# Patient Record
Sex: MALE | Race: WHITE | NOT HISPANIC OR LATINO | Employment: OTHER | URBAN - METROPOLITAN AREA
[De-identification: names, ages, dates, MRNs, and addresses within clinical notes are randomized per-mention and may not be internally consistent; named-entity substitution may affect disease eponyms.]

---

## 2017-08-08 ENCOUNTER — TRANSFERRED RECORDS (OUTPATIENT)
Dept: HEALTH INFORMATION MANAGEMENT | Facility: CLINIC | Age: 62
End: 2017-08-08

## 2017-09-18 ENCOUNTER — SURGERY (OUTPATIENT)
Age: 62
End: 2017-09-18

## 2017-09-18 ENCOUNTER — APPOINTMENT (OUTPATIENT)
Dept: GENERAL RADIOLOGY | Facility: CLINIC | Age: 62
End: 2017-09-18
Attending: ORTHOPAEDIC SURGERY
Payer: COMMERCIAL

## 2017-09-18 ENCOUNTER — ANESTHESIA EVENT (OUTPATIENT)
Dept: SURGERY | Facility: CLINIC | Age: 62
End: 2017-09-18
Payer: COMMERCIAL

## 2017-09-18 ENCOUNTER — ANESTHESIA (OUTPATIENT)
Dept: SURGERY | Facility: CLINIC | Age: 62
End: 2017-09-18
Payer: COMMERCIAL

## 2017-09-18 ENCOUNTER — HOSPITAL ENCOUNTER (OUTPATIENT)
Facility: CLINIC | Age: 62
Discharge: HOME | End: 2017-09-18
Attending: ORTHOPAEDIC SURGERY | Admitting: ORTHOPAEDIC SURGERY
Payer: COMMERCIAL

## 2017-09-18 VITALS
HEIGHT: 71 IN | TEMPERATURE: 96.4 F | SYSTOLIC BLOOD PRESSURE: 126 MMHG | BODY MASS INDEX: 32.48 KG/M2 | WEIGHT: 232 LBS | DIASTOLIC BLOOD PRESSURE: 77 MMHG | OXYGEN SATURATION: 95 % | RESPIRATION RATE: 16 BRPM

## 2017-09-18 DIAGNOSIS — Z98.890 S/P FOOT SURGERY, RIGHT: Primary | ICD-10-CM

## 2017-09-18 PROCEDURE — 40000170 ZZH STATISTIC PRE-PROCEDURE ASSESSMENT II: Performed by: ORTHOPAEDIC SURGERY

## 2017-09-18 PROCEDURE — 25000128 H RX IP 250 OP 636: Performed by: NURSE ANESTHETIST, CERTIFIED REGISTERED

## 2017-09-18 PROCEDURE — 25000125 ZZHC RX 250: Performed by: NURSE ANESTHETIST, CERTIFIED REGISTERED

## 2017-09-18 PROCEDURE — L8699 PROSTHETIC IMPLANT NOS: HCPCS | Performed by: ORTHOPAEDIC SURGERY

## 2017-09-18 PROCEDURE — 37000008 ZZH ANESTHESIA TECHNICAL FEE, 1ST 30 MIN: Performed by: ORTHOPAEDIC SURGERY

## 2017-09-18 PROCEDURE — 25000128 H RX IP 250 OP 636: Performed by: ANESTHESIOLOGY

## 2017-09-18 PROCEDURE — 25000566 ZZH SEVOFLURANE, EA 15 MIN: Performed by: ORTHOPAEDIC SURGERY

## 2017-09-18 PROCEDURE — 27211024 ZZHC OR SUPPLY OTHER OPNP: Performed by: ORTHOPAEDIC SURGERY

## 2017-09-18 PROCEDURE — 25000128 H RX IP 250 OP 636: Performed by: PHYSICIAN ASSISTANT

## 2017-09-18 PROCEDURE — 36000060 ZZH SURGERY LEVEL 3 W FLUORO 1ST 30 MIN: Performed by: ORTHOPAEDIC SURGERY

## 2017-09-18 PROCEDURE — 71000012 ZZH RECOVERY PHASE 1 LEVEL 1 FIRST HR: Performed by: ORTHOPAEDIC SURGERY

## 2017-09-18 PROCEDURE — C1713 ANCHOR/SCREW BN/BN,TIS/BN: HCPCS | Performed by: ORTHOPAEDIC SURGERY

## 2017-09-18 PROCEDURE — 27210794 ZZH OR GENERAL SUPPLY STERILE: Performed by: ORTHOPAEDIC SURGERY

## 2017-09-18 PROCEDURE — 36000058 ZZH SURGERY LEVEL 3 EA 15 ADDTL MIN: Performed by: ORTHOPAEDIC SURGERY

## 2017-09-18 PROCEDURE — 71000013 ZZH RECOVERY PHASE 1 LEVEL 1 EA ADDTL HR: Performed by: ORTHOPAEDIC SURGERY

## 2017-09-18 PROCEDURE — 37000009 ZZH ANESTHESIA TECHNICAL FEE, EACH ADDTL 15 MIN: Performed by: ORTHOPAEDIC SURGERY

## 2017-09-18 PROCEDURE — 40000277 XR SURGERY CARM FLUORO LESS THAN 5 MIN W STILLS

## 2017-09-18 PROCEDURE — 71000027 ZZH RECOVERY PHASE 2 EACH 15 MINS: Performed by: ORTHOPAEDIC SURGERY

## 2017-09-18 PROCEDURE — 27110028 ZZH OR GENERAL SUPPLY NON-STERILE: Performed by: ORTHOPAEDIC SURGERY

## 2017-09-18 DEVICE — GRAFT BONE INFUSE BMP SM 7510200: Type: IMPLANTABLE DEVICE | Site: ANKLE | Status: FUNCTIONAL

## 2017-09-18 DEVICE — IMP SCR ARTHREX BLUE LOCKING 3.5X24MM AR-8935L-24: Type: IMPLANTABLE DEVICE | Site: ANKLE | Status: FUNCTIONAL

## 2017-09-18 DEVICE — IMP SCR ARTHREX CAN 4.0X28MM AR-8940-28: Type: IMPLANTABLE DEVICE | Site: ANKLE | Status: FUNCTIONAL

## 2017-09-18 DEVICE — IMP PLATE ARTHREX MIDFOOT FLAT H SM LT AR-8942L-S: Type: IMPLANTABLE DEVICE | Site: ANKLE | Status: FUNCTIONAL

## 2017-09-18 DEVICE — IMP PLATE ARTHREX MIDFOOT FLAT H SM RT AR-8942R-S: Type: IMPLANTABLE DEVICE | Site: ANKLE | Status: FUNCTIONAL

## 2017-09-18 DEVICE — IMP SCR ARTHREX BLUE LOCKING 3.5X32MM AR-8935L-32: Type: IMPLANTABLE DEVICE | Site: ANKLE | Status: FUNCTIONAL

## 2017-09-18 DEVICE — IMP SCR ARTHREX BLUE LOCKING 3.5X26MM AR-8935L-26: Type: IMPLANTABLE DEVICE | Site: ANKLE | Status: FUNCTIONAL

## 2017-09-18 DEVICE — IMP SCR ARTHREX CAN 4.0X30MM AR-8940-30: Type: IMPLANTABLE DEVICE | Site: ANKLE | Status: FUNCTIONAL

## 2017-09-18 DEVICE — IMP SCR ARTHREX CAN 4.0X26MM AR-8940-26: Type: IMPLANTABLE DEVICE | Site: ANKLE | Status: FUNCTIONAL

## 2017-09-18 DEVICE — IMP SCR ARTHREX BLUE LOCKING 3.5X16MM AR-8935L-16: Type: IMPLANTABLE DEVICE | Site: ANKLE | Status: FUNCTIONAL

## 2017-09-18 RX ORDER — MEPERIDINE HYDROCHLORIDE 25 MG/ML
12.5 INJECTION INTRAMUSCULAR; INTRAVENOUS; SUBCUTANEOUS
Status: DISCONTINUED | OUTPATIENT
Start: 2017-09-18 | End: 2017-09-18 | Stop reason: HOSPADM

## 2017-09-18 RX ORDER — CEFAZOLIN SODIUM 1 G/3ML
1 INJECTION, POWDER, FOR SOLUTION INTRAMUSCULAR; INTRAVENOUS SEE ADMIN INSTRUCTIONS
Status: DISCONTINUED | OUTPATIENT
Start: 2017-09-18 | End: 2017-09-18 | Stop reason: HOSPADM

## 2017-09-18 RX ORDER — LIDOCAINE HYDROCHLORIDE 20 MG/ML
INJECTION, SOLUTION INFILTRATION; PERINEURAL PRN
Status: DISCONTINUED | OUTPATIENT
Start: 2017-09-18 | End: 2017-09-18

## 2017-09-18 RX ORDER — NEOSTIGMINE METHYLSULFATE 1 MG/ML
VIAL (ML) INJECTION PRN
Status: DISCONTINUED | OUTPATIENT
Start: 2017-09-18 | End: 2017-09-18

## 2017-09-18 RX ORDER — AMOXICILLIN 250 MG
1-2 CAPSULE ORAL 2 TIMES DAILY
Qty: 30 TABLET | Refills: 0 | Status: SHIPPED | OUTPATIENT
Start: 2017-09-18 | End: 2022-08-18

## 2017-09-18 RX ORDER — OXYCODONE AND ACETAMINOPHEN 5; 325 MG/1; MG/1
1-2 TABLET ORAL
Status: DISCONTINUED | OUTPATIENT
Start: 2017-09-18 | End: 2017-09-18 | Stop reason: HOSPADM

## 2017-09-18 RX ORDER — SODIUM CHLORIDE, SODIUM LACTATE, POTASSIUM CHLORIDE, CALCIUM CHLORIDE 600; 310; 30; 20 MG/100ML; MG/100ML; MG/100ML; MG/100ML
INJECTION, SOLUTION INTRAVENOUS CONTINUOUS
Status: DISCONTINUED | OUTPATIENT
Start: 2017-09-18 | End: 2017-09-18 | Stop reason: HOSPADM

## 2017-09-18 RX ORDER — PROPOFOL 10 MG/ML
INJECTION, EMULSION INTRAVENOUS CONTINUOUS PRN
Status: DISCONTINUED | OUTPATIENT
Start: 2017-09-18 | End: 2017-09-18

## 2017-09-18 RX ORDER — ONDANSETRON 4 MG/1
4 TABLET, ORALLY DISINTEGRATING ORAL EVERY 30 MIN PRN
Status: DISCONTINUED | OUTPATIENT
Start: 2017-09-18 | End: 2017-09-18 | Stop reason: HOSPADM

## 2017-09-18 RX ORDER — ONDANSETRON 2 MG/ML
4 INJECTION INTRAMUSCULAR; INTRAVENOUS EVERY 30 MIN PRN
Status: DISCONTINUED | OUTPATIENT
Start: 2017-09-18 | End: 2017-09-18 | Stop reason: HOSPADM

## 2017-09-18 RX ORDER — HYDROXYZINE HYDROCHLORIDE 25 MG/1
25 TABLET, FILM COATED ORAL EVERY 6 HOURS PRN
Qty: 30 TABLET | Refills: 0 | Status: SHIPPED | OUTPATIENT
Start: 2017-09-18 | End: 2022-08-18

## 2017-09-18 RX ORDER — FENTANYL CITRATE 50 UG/ML
INJECTION, SOLUTION INTRAMUSCULAR; INTRAVENOUS PRN
Status: DISCONTINUED | OUTPATIENT
Start: 2017-09-18 | End: 2017-09-18

## 2017-09-18 RX ORDER — LOSARTAN POTASSIUM AND HYDROCHLOROTHIAZIDE 12.5; 1 MG/1; MG/1
1 TABLET ORAL DAILY
COMMUNITY
End: 2022-08-18

## 2017-09-18 RX ORDER — OXYCODONE AND ACETAMINOPHEN 5; 325 MG/1; MG/1
1-2 TABLET ORAL EVERY 4 HOURS PRN
Qty: 40 TABLET | Refills: 0 | Status: SHIPPED | OUTPATIENT
Start: 2017-09-18 | End: 2022-08-18

## 2017-09-18 RX ORDER — CEFAZOLIN SODIUM 2 G/100ML
2 INJECTION, SOLUTION INTRAVENOUS
Status: COMPLETED | OUTPATIENT
Start: 2017-09-18 | End: 2017-09-18

## 2017-09-18 RX ORDER — PROPOFOL 10 MG/ML
INJECTION, EMULSION INTRAVENOUS PRN
Status: DISCONTINUED | OUTPATIENT
Start: 2017-09-18 | End: 2017-09-18

## 2017-09-18 RX ORDER — ONDANSETRON 4 MG/1
4-8 TABLET, ORALLY DISINTEGRATING ORAL EVERY 8 HOURS PRN
Qty: 12 TABLET | Refills: 0 | Status: SHIPPED | OUTPATIENT
Start: 2017-09-18 | End: 2022-08-18

## 2017-09-18 RX ORDER — GLYCOPYRROLATE 0.2 MG/ML
INJECTION, SOLUTION INTRAMUSCULAR; INTRAVENOUS PRN
Status: DISCONTINUED | OUTPATIENT
Start: 2017-09-18 | End: 2017-09-18

## 2017-09-18 RX ORDER — FENTANYL CITRATE 50 UG/ML
25-50 INJECTION, SOLUTION INTRAMUSCULAR; INTRAVENOUS EVERY 5 MIN PRN
Status: DISCONTINUED | OUTPATIENT
Start: 2017-09-18 | End: 2017-09-18 | Stop reason: HOSPADM

## 2017-09-18 RX ORDER — FENTANYL CITRATE 50 UG/ML
25-50 INJECTION, SOLUTION INTRAMUSCULAR; INTRAVENOUS
Status: DISCONTINUED | OUTPATIENT
Start: 2017-09-18 | End: 2017-09-18 | Stop reason: HOSPADM

## 2017-09-18 RX ORDER — HYDROMORPHONE HYDROCHLORIDE 1 MG/ML
.3-.5 INJECTION, SOLUTION INTRAMUSCULAR; INTRAVENOUS; SUBCUTANEOUS EVERY 10 MIN PRN
Status: DISCONTINUED | OUTPATIENT
Start: 2017-09-18 | End: 2017-09-18 | Stop reason: HOSPADM

## 2017-09-18 RX ORDER — ONDANSETRON 4 MG/1
4 TABLET, ORALLY DISINTEGRATING ORAL
Status: DISCONTINUED | OUTPATIENT
Start: 2017-09-18 | End: 2017-09-18 | Stop reason: HOSPADM

## 2017-09-18 RX ORDER — DEXAMETHASONE SODIUM PHOSPHATE 4 MG/ML
INJECTION, SOLUTION INTRA-ARTICULAR; INTRALESIONAL; INTRAMUSCULAR; INTRAVENOUS; SOFT TISSUE PRN
Status: DISCONTINUED | OUTPATIENT
Start: 2017-09-18 | End: 2017-09-18

## 2017-09-18 RX ORDER — SUMATRIPTAN 50 MG/1
50 TABLET, FILM COATED ORAL
COMMUNITY

## 2017-09-18 RX ORDER — TADALAFIL 20 MG/1
10-20 TABLET ORAL DAILY PRN
COMMUNITY

## 2017-09-18 RX ORDER — NALOXONE HYDROCHLORIDE 0.4 MG/ML
.1-.4 INJECTION, SOLUTION INTRAMUSCULAR; INTRAVENOUS; SUBCUTANEOUS
Status: DISCONTINUED | OUTPATIENT
Start: 2017-09-18 | End: 2017-09-18 | Stop reason: HOSPADM

## 2017-09-18 RX ORDER — EPHEDRINE SULFATE 50 MG/ML
INJECTION, SOLUTION INTRAMUSCULAR; INTRAVENOUS; SUBCUTANEOUS PRN
Status: DISCONTINUED | OUTPATIENT
Start: 2017-09-18 | End: 2017-09-18

## 2017-09-18 RX ADMIN — GLYCOPYRROLATE 0.6 MG: 0.2 INJECTION, SOLUTION INTRAMUSCULAR; INTRAVENOUS at 15:05

## 2017-09-18 RX ADMIN — NEOSTIGMINE METHYLSULFATE 3 MG: 1 INJECTION INTRAMUSCULAR; INTRAVENOUS; SUBCUTANEOUS at 15:05

## 2017-09-18 RX ADMIN — MIDAZOLAM HYDROCHLORIDE 2 MG: 1 INJECTION, SOLUTION INTRAMUSCULAR; INTRAVENOUS at 13:58

## 2017-09-18 RX ADMIN — SODIUM CHLORIDE, POTASSIUM CHLORIDE, SODIUM LACTATE AND CALCIUM CHLORIDE: 600; 310; 30; 20 INJECTION, SOLUTION INTRAVENOUS at 13:40

## 2017-09-18 RX ADMIN — MIDAZOLAM HYDROCHLORIDE 2 MG: 1 INJECTION, SOLUTION INTRAMUSCULAR; INTRAVENOUS at 12:49

## 2017-09-18 RX ADMIN — PROPOFOL 200 MCG/KG/MIN: 10 INJECTION, EMULSION INTRAVENOUS at 13:59

## 2017-09-18 RX ADMIN — CEFAZOLIN SODIUM 2 G: 2 INJECTION, SOLUTION INTRAVENOUS at 14:12

## 2017-09-18 RX ADMIN — ROPIVACAINE HYDROCHLORIDE 50 ML: 5 INJECTION, SOLUTION EPIDURAL; INFILTRATION; PERINEURAL at 12:57

## 2017-09-18 RX ADMIN — LIDOCAINE HYDROCHLORIDE 100 MG: 20 INJECTION, SOLUTION INFILTRATION; PERINEURAL at 13:59

## 2017-09-18 RX ADMIN — Medication 5 MG: at 14:42

## 2017-09-18 RX ADMIN — ONDANSETRON 4 MG: 2 INJECTION INTRAMUSCULAR; INTRAVENOUS at 14:56

## 2017-09-18 RX ADMIN — ROCURONIUM BROMIDE 50 MG: 10 INJECTION INTRAVENOUS at 14:02

## 2017-09-18 RX ADMIN — FENTANYL CITRATE 50 MCG: 50 INJECTION, SOLUTION INTRAMUSCULAR; INTRAVENOUS at 15:07

## 2017-09-18 RX ADMIN — SODIUM CHLORIDE, POTASSIUM CHLORIDE, SODIUM LACTATE AND CALCIUM CHLORIDE: 600; 310; 30; 20 INJECTION, SOLUTION INTRAVENOUS at 14:55

## 2017-09-18 RX ADMIN — PHENYLEPHRINE HYDROCHLORIDE 100 MCG: 10 INJECTION, SOLUTION INTRAMUSCULAR; INTRAVENOUS; SUBCUTANEOUS at 14:35

## 2017-09-18 RX ADMIN — PHENYLEPHRINE HYDROCHLORIDE 150 MCG: 10 INJECTION, SOLUTION INTRAMUSCULAR; INTRAVENOUS; SUBCUTANEOUS at 14:21

## 2017-09-18 RX ADMIN — FENTANYL CITRATE 50 MCG: 50 INJECTION, SOLUTION INTRAMUSCULAR; INTRAVENOUS at 13:59

## 2017-09-18 RX ADMIN — PROPOFOL 200 MG: 10 INJECTION, EMULSION INTRAVENOUS at 13:59

## 2017-09-18 RX ADMIN — DEXAMETHASONE SODIUM PHOSPHATE 4 MG: 4 INJECTION, SOLUTION INTRA-ARTICULAR; INTRALESIONAL; INTRAMUSCULAR; INTRAVENOUS; SOFT TISSUE at 14:15

## 2017-09-18 RX ADMIN — PROPOFOL 70 MG: 10 INJECTION, EMULSION INTRAVENOUS at 14:02

## 2017-09-18 NOTE — ANESTHESIA PROCEDURE NOTES
Procedures  Rt popliteal and saphenous blocks for pain relief at surgeon's request.  With the patient in the prone position, after IV started,  pulse oximeter in place and Versed 2mg IV: 22 GA Stimuplex and 25 GA; 50cc (40 + 10) 1/2 % Ropivicaine; 1;200,000 Epi   Depth 6cm

## 2017-09-18 NOTE — IP AVS SNAPSHOT
MRN:[id number]                      After Visit Summary   9/18/2017    Ned Pimentel    MRN: [id number]           Thank you!     Thank you for choosing [facility] for your care. Our goal is always to provide you with excellent care. Hearing back from our patients is one way we can continue to improve our services. Please take a few minutes to complete the written survey that you may receive in the mail after you visit with us. Thank you!        Patient Information     Date Of Birth          1955        About your hospital stay     You were admitted on:  September 18, 2017 [facility]    You were discharged on:  September 18, 2017       Who to Call     For medical emergencies, please call 911.  For non-urgent questions about your medical care, please call your primary care provider or clinic, [phone]  For questions related to your surgery, please call your surgery clinic        Attending Provider     Provider Specialty    Mary Jaeger MD Internal Medicine       Primary Care Provider Office Phone # Fax #    Jarvis Hopper MD, [phone]      After Care Instructions     Activity       Ambulate with assistance until independent            Discharge Instructions       Review discharge instructions as directed by Provider.            Discharge Instructions       Patient to arrange for return to clinic appointment in two weeks.            Elevate affected extremity           Ice to affected area       Ice pack to affected area PRN (as needed).            No Aspirin, Ibuprofen or Naproxen products        for 7 - 10 days post surgery            No dressing change       until follow up clinic appointment.            No driving or operating machinery       until the day after procedure            Weight bearing status - Toe touch                 Further instructions from your care team       Same Day Surgery Discharge  Instructions for  Sedation and General Anesthesia       It's not unusual to feel dizzy, light-headed or faint for up to 24 hours after surgery or while taking pain medication.  If you have these symptoms: sit for a few minutes before standing and have someone assist you when you get up to walk or use the bathroom.      You should rest and relax for the next 24 hours. We recommend you make arrangements to have an adult stay with you for at least 24 hours after your discharge.  Avoid hazardous and strenuous activity.      DO NOT DRIVE any vehicle or operate mechanical equipment for 24 hours following the end of your surgery.  Even though you may feel normal, your reactions may be affected by the medication you have received.      Do not drink alcoholic beverages for 24 hours following surgery.       Slowly progress to your regular diet as you feel able. It's not unusual to feel nauseated and/or vomit after receiving anesthesia.  If you develop these symptoms, drink clear liquids (apple juice, ginger ale, broth, 7-up, etc. ) until you feel better.  If your nausea and vomiting persists for 24 hours, please notify your surgeon.        All narcotic pain medications, along with inactivity and anesthesia, can cause constipation. Drinking plenty of liquids and increasing fiber intake will help.      For any questions of a medical nature, call your surgeon.      Do not make important decisions for 24 hours.      If you had general anesthesia, you may have a sore throat for a couple of days related to the breathing tube used during surgery.  You may use Cepacol lozenges to help with this discomfort.  If it worsens or if you develop a fever, contact your surgeon.       If you feel your pain is not well managed with the pain medications prescribed by your surgeon, please contact your surgeon's office to let them know so they can address your concerns.       POST-OPERATIVE INSTRUCTIONS  FOOT AND ANKLE SURGERY  LORENA Jaeger,  SUHA Magana P.A.-C    These precautions MUST be followed for the first 24 hours after surgery:    Upon discharge, go directly home.    You must make arrangements to have a responsible adult stay with you.    DO NOT DRINK ALCOHOLIC BEVERAGES    It is not unusual to feel lightheaded up to 24 hours after surgery or while taking pain medication.  If you feel lightheaded, sit for a few minutes before standing and have someone assisted you when you get up to walk or use the restroom.    Do not use any mechanical equipment.    Do not make any important or legal decisions for 24 hours or while on pain medications.    You may experience dry mouth, sore throat, or sleep disturbances from the anesthesia and medications used during surgery.  Generalized muscle aches can sometimes occur.  These usually disappear in 12-24 hours.    POST-OPERATIVE CARE GUIDELINES    The following are general guidelines about what to expect the first days/weeks after surgery.  They are not specific, and your recovery may be slightly different.    Blood clots are not common, but are emergencies.  If you have sudden onset pain in your calf or leg, or have sudden shortness of breath, go to the Emergency Department.      Elevation of your operative foot/ankle is key to reducing the swelling in the immediate post-operative phase (first 3-5 days).  When you are at rest, elevate your foot at or above the level of your heart.  When sitting, your foot should be elevated on a chair or stool; not hanging down.      You should plan to rest the day after surgery no matter how minor the procedure.  More complicated procedures will require more time to return to normal activity.      Foot and ankle surgery is painful in most cases - use your medication as directed for the first 24 hours after surgery.  It is not unusual for the pain to be worse a day or two after surgery than on the day of.  If your pain is more than 8/10 contact our office.  If you  don t have pain, gradually decrease your pain meds by substituting Tylenol.  Please don t use Advil/ibuprofen unless we order it for you.      You will be prescribed Percocet or Vicodin for pain, Vistaril for spasms/pain adjunct, Senokot for constipation.  If you have had trouble taking these meds before or experience nausea or vomiting after surgery from your medication, please advise the recovery room nurses.  If you are already at home, try drinking only clear liquids and/or call our office.      All pain medications, along with inactivity can cause constipation.  Use the Senakot as directed, increase fluid intake to 1 quart per day and increase your dietary fiber.  (The  P  fruits - peaches, plums, pears, and prunes as well as anise/black licorice are recommended.)    It is not unusual to run a low-grade fever after surgery.  If your temperature is elevated above 102 , lasts longer than 24 hours, or is questionable in any way, contact our office.      Drainage from your cast/dressing is normal.  Reinforce your cast/dressing with 4x4 dressings and cover with an Ace wrap.  Wait until 24 hours after surgery to change your dressings; by this time most of your bleeding will have stopped.  If you have drainage through your dressings 2 days after surgery, contact our office.      You cast/dressing will be changed at your 2-week follow up appointment.  They should be kept clean and DRY.  If your cast/dressing is damaged before that, contact our office.      It is normal to experience some bruising in the toes after surgery and they may appear  dark  when your foot hangs down.  It is important to actively wiggle your toes for at least 5-10 minutes each hour UNLESS you had surgery on those toes.      Use ice on your foot/ankle over the dressing/cast for 20 minutes per hour, 10 or more times per day.  A large bag of frozen peas works well.      Bathing: take a tub or sponge bath instead of a shower if possible during the  first two weeks.  If you choose to shower, cover the dressing/cast with a waterproof covering, these may be ordered from www.seal-tight.com or are available at some pharmacies/medical supply stores.  Another option is XeroSox, which is the original vacuum sealed bandage and cast cover.  The cast cover has a vacuum seal and is absolutely waterproof.  1-203.412.8367 or www.xerosox.[employer] for more information.      Driving:  For surgery on the left foot/ankle, you may drive as soon as narcotic medications are stopped.  For surgery on the right foot/ankle, you may drive when you are out of a cast, off pain medications, and you feel secure with braking.      In general, listen to your foot/ankle.  If you have a sudden, dramatic increase in pain that does not resolve after an hour or so, something is wrong - call our office.  If you fall or bump your foot/ankle and have sudden pain that resolves, give it 24 hours before you call.      Many of your questions can be addressed at your 2-week follow-up appointment - please make a list of things to ask us as they come up during your recovery.      If you had a nerve block it is common to have numbness in your leg and foot for up to 30 hours after surgery.  If your leg or foot is still numb more than 30 hours after surgery, please call the office.      FUTURE DENTIST VISITS:  If you have had a total ankle arthroplasty please be advised you must be on antibiotics prior to ANY dental work.  Please call your dentist office ahead of time and make them aware of this as your dentist will be able to order the prescription.  If you have had any other surgery this is not a concern.    If you have any further questions or concerns, please call our office at [phone]    **If you have questions or concerns about your procedure,   call Dr. Jaeger at [phone]**    [facility]      DISCHARGE INSTRUCTIONS FOLLOWING   REGIONAL BLOCK ANESTHESIA      Numbness or lack of  "feeling in the arm/leg that was operated may last up to 24 hours.  The average time is usually 10-15 hours.  You may not be able to lift or move the arm or leg where the operation was by itself during that time.  Long-acting local anesthetic medicines were used to give you long-lasting pain relief.    Wear a sling until your arm is completely \"awake\"    Avoid bumping your arm, leg or foot while it is numb    Avoid extremes of hot or cold while it is numb    Remain quiet and restful the day of surgery.  Resume normal activities gradually over the next day or so as advise by your surgeon.    Do not drive or operate  Any machinery until your extremity is full  \"awake\"        You will have a tingling and prickly sensation in your arm/leg as the feeling begins to return; you can also expect some discomfort. The amount of discomfort is unpredictable, but if you have more pain that can be controlled with the pain medication you received, you should contact your surgeon.  Start to take your pain pills as soon as you start to feel any discomfort or pain.  We strongly recommend starting your pain medication at bedtime if you haven't already done so.  This is in the event that the block wears off while you are asleep.        Crutch Instructions      Because of your surgery you will need crutches.  Make sure you tell your healthcare provider if crutches do not seem to work for you.  Using Crutches   Crutches are the most commonly used device for injuries to the lower part of the body because they allow the most mobility. All walking assistance devices require strength in your upper body but crutches require more coordination. If you are unable to use crutches then a cane or walker may be better.   Remember these rules when using crutches:   Always look straight ahead when you walk. Do not look down.   Hold the top padded part of the crutches into your chest near your armpits. Grasp the padded hand  and always support your " "weight with your arms and hands.   Do not lean on the crutches with your armpit as this could cause nerve damage.  Standing Up  Make sure you are in a stable chair. Move forward to the edge of the chair so that your  good  foot is flat on the floor. Put both crutches together and hold the handgrips in one hand. Stretch the injured leg out straight and then use the crutches with one hand and the chair armrest with the other hand to push yourself into a standing position onto your  good  leg. Once you are standing, wait until you are steady before placing a crutch in each hand. Until you become good at standing, have someone assist you in case you lose your balance. When standing still, lean slightly forward with the crutches ahead of you and about 3 feet apart. Unless you are told otherwise, you should keep weight off your injured leg.  Walking  To begin crutch walking, balance yourself on your  good  leg. Move both crutches forward about the length of one step and place them firmly on the floor in front of you about 3 feet apart. Support your weight on the padded hand rests while leaning forward. Press the top of the crutches against your chest wall and not into your armpits. Be sure to hold the injured leg up off of the floor. When ready, swing the \"good\" leg forward about one step length past the crutches while supporting your weight on the padded hand . Be careful not to go too far. Transfer your weight onto the \"good\" leg then bring both crutches forward about the length of one step. Repeating this motion will allow you to move fairly quickly without the use of your injured leg. Keep practicing until you become good at crutch walking.  Sitting Down  Make sure the chair you are about to sit on is stable. Walk in front of the chair such that you are facing away from it. Move back a little at a time until the back of your  good  leg is nearly touching the seat. Keeping your weight on the  good  leg, move both " "crutches to one hand holding onto the handgrips. Lean forward, bend your  good  knee, and move your injured leg out forward. Sit down slowly while using your free hand to reach for the chair s armrest for support. Place the crutches where you can easily get them. Never pivot, even on your  good  leg. This could cause you to fall or injure your  good  leg.  Navigating Stairs, Curbs & Door Steps  Only attempt this once you are very comfortable with regular crutch walking and only when someone is there to steady you should you begin to lose your balance. Hold on to a  railing with one hand and both crutches in the other hand or have someone carry the loose crutch for you. It does not matter which side the handrail is on. If there is no handrail, you can use both crutches. Using only crutches is the same as the railing method but maintaining your balance can be difficult. The crutch-only method is not recommended until you have become very good at crutch walking. Be sure to only take one step at a time. A simple rule to remember for crutches when navigating stairs or curbs: up with the  good  leg and down with the  bad .  Going Up Stairs or Curbs  Walk close to the first step with the crutches slightly behind you. Push down on the handrail and the crutch and step up with the \"good\" leg. You may have to make a short hop to do this if you are not allowed to place any weight on the injured leg. Lean forward and bring the injured leg and the crutch up beside the \"good\" leg. Repeat this until you have climbed up all of the steps. Remember, the \"good\" leg goes up first and the crutches move with the injured leg. The person helping you should stand behind and to your side that is away from the railing.  Going Down Stairs or Curbs  Walk to the edge of the first step. While standing and balancing on the  good  leg, place both crutches in one hand and then down on the step below. Be sure to support your weight by " "leaning on the crutches and handrail. Bring the injured leg forward, then the \"good\" leg down to the same step as the crutches. Remember crutches go down first with the injured leg. The person helping you should  front and to your side that is away from the railing.  Sitting Method for Navigating Stairs  A safer way to get up and down stairs is to sit down. To go up steps, sit down on the second or third step. Push with your  good  leg below while pushing up with both arms on the step above to move your bottom to the next step. When you get to the top of the stairs you may need to use the  scooting  method described later to get back up. To go down steps you first need to lower yourself to the floor near the top of the steps. Once seated, support yourself with your  good  leg on the second to next step below, and push with both arms on the step where you are sitting to move your bottom down to the next step. When you reach the bottom, pull yourself up to standing position using your crutches. It is helpful if someone can move your crutches and assist you in getting up and down. With practice it may be possible to manage on your own.  If You Start To Fall  If you start to fall and cannot get your balance, throw the crutches away from you. Try to fall onto your  good  side away from your injury and then break your fall with your arms. If uninjured, you can usually get back up by moving into a sitting position and scooting to a chair. Push with your arms and hands on the chair seat while pushing with the \"good\" leg to get up into the chair. You should not attempt to get back up if you are having significant pain. Call for assistance or dial 911 for an ambulance if necessary.  Safety Tips for Crutch Walking   At first you may want to wear a training belt (or a strong regular belt) so others can assist you.   Do not use crutches if you feel dizzy or drowsy.   Be careful on slick or wet surfaces like a kitchen or " "bathroom floor, snow, ice, or rainy conditions.   Temporarily remove pets, throw rugs or other items from your home that might trip you.   Do not hop around while holding onto furniture.   Wear sneakers or rubber soled shoes that will not easily slip or come off.   Be careful on ramps or slopes as they can be harder to walk up or down   Do not remove any parts from your crutches especially the padding or rubber traction footings. Replace padding or footings that become damaged immediately.   It is important to use your crutches correctly. If you feel any numbness or tingling in your arms, you are probably using the crutches incorrectly.  Helpful Hints   A bedside toilet or toilet riser may be helpful.   Always allow for extra time to get around. Children in school should be given permission to leave class early to avoid crowds when changing classes.   Elevate the injured leg when sitting.   Use a backpack to carry books or waist pouch to carry other items so that both hands are free.   Call your healthcare provider if you have any questions or concerns.                          Pending Results     Date and Time Order Name Status Description    9/18/2017 1500 XR Surgery DILLAN L/T 5 Min Fluoro w Stills In process             Statement of Approval     Ordered          09/18/17 6508  I have reviewed and agree with all the recommendations and orders detailed in this document.  EFFECTIVE NOW     Approved and electronically signed by:  Mukul Magana, GABRIELLE             Admission Information     Date & Time Provider Department Dept. Phone    9/18/2017 Mary Jaeger MD [facility] Same Day Surgery [phone]      Your Vitals Were     Blood Pressure Temperature Respirations Height Weight Pulse Oximetry    100/68 96.4  F (35.8  C) 14 1.803 m (5' 11\") 105.2 kg (232 lb) 96%    BMI (Body Mass Index)                   32.36 kg/m2           MyChart Information     [email or web address] lets you send messages to your doctor, " "view your test results, renew your prescriptions, schedule appointments and more. To sign up, go to [email or web address] . Click on \"Log in\" on the left side of the screen, which will take you to the Welcome page. Then click on \"Sign up Now\" on the right side of the page.     You will be asked to enter the access code listed below, as well as some personal information. Please follow the directions to create your username and password.     Your access code is: [email or web address]  Expires: 2017  3:45 PM     Your access code will  in 90 days. If you need help or a new code, please call your [facility] clinic or [phone].        Care EveryWhere ID     This is your Care EveryWhere ID. This could be used by other organizations to access your [facility] medical records  [id number]        Equal Access to Services     NATHAN [facility] CenterALESSANDRA : Matt Sanabria, fermin rojas, marcelino guzmán, mary alice lenz . [facility] [phone].    ATENCIÓN: Si habla español, tiene a regan disposición servicios gratuitos de asistencia lingüística. Earnest al [phone].    We comply with applicable federal civil rights laws and [place] laws. We do not discriminate on the basis of race, color, national origin, age, disability sex, sexual orientation or gender identity.               Review of your medicines      START taking        Dose / Directions    hydrOXYzine 25 MG tablet   Commonly known as:  ATARAX   Used for:  S/P foot surgery, right        Dose:  25 mg   Take 1 tablet (25 mg) by mouth every 6 hours as needed for itching (and nausea)   Quantity:  30 tablet   Refills:  0       ondansetron 4 MG ODT tab   Commonly known as:  ZOFRAN-ODT   Used for:  S/P foot surgery, right        Dose:  4-8 mg   Take 1-2 tablets (4-8 mg) by mouth every 8 hours as needed for nausea Dissolve ON the tongue.   Quantity:  12 tablet   Refills:  0       oxyCODONE-acetaminophen 5-325 MG per tablet   Commonly " known as:  PERCOCET   Used for:  S/P foot surgery, right        Dose:  1-2 tablet   Take 1-2 tablets by mouth every 4 hours as needed for pain (moderate to severe)   Quantity:  40 tablet   Refills:  0       senna-docusate 8.6-50 MG per tablet   Commonly known as:  SENOKOT-S;PERICOLACE   Used for:  S/P foot surgery, right        Dose:  1-2 tablet   Take 1-2 tablets by mouth 2 times daily Take while on oral narcotics to prevent or treat constipation.   Quantity:  30 tablet   Refills:  0         CONTINUE these medicines which have NOT CHANGED        Dose / Directions    AMLODIPINE BESYLATE PO        Dose:  5 mg   Take 5 mg by mouth daily   Refills:  0       ASPIRIN PO        Dose:  81 mg   Take 81 mg by mouth daily   Refills:  0       CIALIS PO        Dose:  20 mg   Take 20 mg by mouth   Refills:  0       IMITREX PO        Dose:  50 mg   Take 50 mg by mouth Take at onset of migraine. May repeat in 2 hours if needed.   Refills:  0       losartan-hydrochlorothiazide 100-12.5 MG per tablet   Commonly known as:  HYZAAR        Dose:  1 tablet   Take 1 tablet by mouth daily   Refills:  0       SILDENAFIL CITRATE PO        Dose:  20 mg   Take 20 mg by mouth   Refills:  0         STOP taking     IBUPROFEN PO                Where to get your medicines      These medications were sent to [facility] - [address]  [address], [place] [address]     Phone:  [phone]     hydrOXYzine 25 MG tablet    ondansetron 4 MG ODT tab    senna-docusate 8.6-50 MG per tablet         Some of these will need a paper prescription and others can be bought over the counter. Ask your nurse if you have questions.     Bring a paper prescription for each of these medications     oxyCODONE-acetaminophen 5-325 MG per tablet                Protect others around you: Learn how to safely use, store and throw away your medicines at www.disposemymeds.org.             Medication List: This is a list of all your  medications and when to take them. Check marks below indicate your daily home schedule. Keep this list as a reference.      Medications           Morning Afternoon Evening Bedtime As Needed    AMLODIPINE BESYLATE PO   Take 5 mg by mouth daily                                ASPIRIN PO   Take 81 mg by mouth daily                                CIALIS PO   Take 20 mg by mouth                                hydrOXYzine 25 MG tablet   Commonly known as:  ATARAX   Take 1 tablet (25 mg) by mouth every 6 hours as needed for itching (and nausea)                                IMITREX PO   Take 50 mg by mouth Take at onset of migraine. May repeat in 2 hours if needed.                                losartan-hydrochlorothiazide 100-12.5 MG per tablet   Commonly known as:  HYZAAR   Take 1 tablet by mouth daily                                ondansetron 4 MG ODT tab   Commonly known as:  ZOFRAN-ODT   Take 1-2 tablets (4-8 mg) by mouth every 8 hours as needed for nausea Dissolve ON the tongue.                                oxyCODONE-acetaminophen 5-325 MG per tablet   Commonly known as:  PERCOCET   Take 1-2 tablets by mouth every 4 hours as needed for pain (moderate to severe)                                senna-docusate 8.6-50 MG per tablet   Commonly known as:  SENOKOT-S;PERICOLACE   Take 1-2 tablets by mouth 2 times daily Take while on oral narcotics to prevent or treat constipation.                                SILDENAFIL CITRATE PO   Take 20 mg by mouth

## 2017-09-18 NOTE — PROGRESS NOTES
S.  Patient was seen today at [facility] for a cam walker for their right foot/ankle as ordered.  O/G. help stabilize foot and ankle.  A.  Patient was fit with a medium short pneumatic Cam Walker for the right leg.  Cam walker was assembled by removing the soft liner from the foot plate and uprights, the patients foot and leg was positioned into the soft liner with the heel firmly sealed.  The liner was closed tightly and secured with the Velcro closure. The heel and foot were positioned in the rocker bottom foot plate and the uprights were contoured to fall at mid-line of the lower leg and secured to the Velcro.  The foot plate Velcro straps were secured, proximal straps first, then the distal strap.  The lower leg straps were attached starting distal and working proximal. The ankle joints were set at 90 degrees of dorsi/plantar flexion. Donning and doffing of the Cam Walker was explained.  P.   Patient was advised to contact this office with any problems or questions.

## 2017-09-18 NOTE — ANESTHESIA PREPROCEDURE EVALUATION
Anesthesia Evaluation     . Pt has had prior anesthetic. Type: General           ROS/MED HX    ENT/Pulmonary:  - neg pulmonary ROS     Neurologic:       Cardiovascular:     (+) hypertension----. : . . . :. .       METS/Exercise Tolerance:     Hematologic:         Musculoskeletal:         GI/Hepatic:  - neg GI/hepatic ROS       Renal/Genitourinary:         Endo:     (+) Obesity, .      Psychiatric:         Infectious Disease:         Malignancy:         Other:                     Physical Exam  Normal systems: cardiovascular and dental    Airway   Mallampati: I  TM distance: >3 FB  Neck ROM: full    Dental     Cardiovascular   Rhythm and rate: regular and normal      Pulmonary    breath sounds clear to auscultation                    Anesthesia Plan      History & Physical Review  History and physical reviewed and following examination; no interval change.    ASA Status:  2 .    NPO Status:  > 8 hours    Plan for General, LMA and Periph. Nerve Block for postop pain with Propofol induction. Maintenance will be TIVA.    PONV prophylaxis:  Ondansetron (or other 5HT-3) and Dexamethasone or Solumedrol       Postoperative Care  Postoperative pain management:  IV analgesics and Oral pain medications.      Consents  Anesthetic plan, risks, benefits and alternatives discussed with:  Patient..                          .

## 2017-09-18 NOTE — IP AVS SNAPSHOT
[facility]    [address]    [place] [address]    Phone:  [phone]    Fax:  [phone]                                       After Visit Summary   9/18/2017    Ned Pimentel    MRN: [id number]           After Visit Summary Signature Page     I have received my discharge instructions, and my questions have been answered. I have discussed any challenges I see with this plan with the nurse or doctor.    ..........................................................................................................................................  Patient/Patient Representative Signature      ..........................................................................................................................................  Patient Representative Print Name and Relationship to Patient    ..................................................               ................................................  Date                                            Time    ..........................................................................................................................................  Reviewed by Signature/Title    ...................................................              ..............................................  Date                                                            Time

## 2017-09-18 NOTE — OR NURSING
Care assumed post nerve block placement.  See anesthesia record for procedure documentation. Pt awaiting surgery.family at bedside.

## 2017-09-18 NOTE — ANESTHESIA POSTPROCEDURE EVALUATION
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    Anesthesia Type:  General, LMA, Periph. Nerve Block for postop pain    Note:  Anesthesia Post Evaluation    Patient location during evaluation: PACU  Patient participation: Able to fully participate in evaluation  Level of consciousness: awake  Pain management: adequate  Airway patency: patent  Cardiovascular status: acceptable  Respiratory status: acceptable  Hydration status: acceptable  PONV: none     Anesthetic complications: None          Last vitals:  Vitals:    09/18/17 1201 09/18/17 1259 09/18/17 1521   BP: 152/90 (!) 126/97    Resp: 18 16 17   Temp: 36.3  C (97.3  F)     SpO2: 96% 94%          Electronically Signed By: Deng Diamond MD  September 18, 2017  3:33 PM

## 2017-09-18 NOTE — DISCHARGE INSTRUCTIONS
[facility] Discharge Instructions for  Sedation and General Anesthesia       It's not unusual to feel dizzy, light-headed or faint for up to 24 hours after surgery or while taking pain medication.  If you have these symptoms: sit for a few minutes before standing and have someone assist you when you get up to walk or use the bathroom.      You should rest and relax for the next 24 hours. We recommend you make arrangements to have an adult stay with you for at least 24 hours after your discharge.  Avoid hazardous and strenuous activity.      DO NOT DRIVE any vehicle or operate mechanical equipment for 24 hours following the end of your surgery.  Even though you may feel normal, your reactions may be affected by the medication you have received.      Do not drink alcoholic beverages for 24 hours following surgery.       Slowly progress to your regular diet as you feel able. It's not unusual to feel nauseated and/or vomit after receiving anesthesia.  If you develop these symptoms, drink clear liquids (apple juice, ginger ale, broth, 7-up, etc. ) until you feel better.  If your nausea and vomiting persists for 24 hours, please notify your surgeon.        All narcotic pain medications, along with inactivity and anesthesia, can cause constipation. Drinking plenty of liquids and increasing fiber intake will help.      For any questions of a medical nature, call your surgeon.      Do not make important decisions for 24 hours.      If you had general anesthesia, you may have a sore throat for a couple of days related to the breathing tube used during surgery.  You may use Cepacol lozenges to help with this discomfort.  If it worsens or if you develop a fever, contact your surgeon.       If you feel your pain is not well managed with the pain medications prescribed by your surgeon, please contact your surgeon's office to let them know so they can address your concerns.       POST-OPERATIVE INSTRUCTIONS  FOOT AND ANKLE  SURGERY  LORENA Jaeger M.D.  Constantin Magana P.A.-C    These precautions MUST be followed for the first 24 hours after surgery:    Upon discharge, go directly home.    You must make arrangements to have a responsible adult stay with you.    DO NOT DRINK ALCOHOLIC BEVERAGES    It is not unusual to feel lightheaded up to 24 hours after surgery or while taking pain medication.  If you feel lightheaded, sit for a few minutes before standing and have someone assisted you when you get up to walk or use the restroom.    Do not use any mechanical equipment.    Do not make any important or legal decisions for 24 hours or while on pain medications.    You may experience dry mouth, sore throat, or sleep disturbances from the anesthesia and medications used during surgery.  Generalized muscle aches can sometimes occur.  These usually disappear in 12-24 hours.    POST-OPERATIVE CARE GUIDELINES    The following are general guidelines about what to expect the first days/weeks after surgery.  They are not specific, and your recovery may be slightly different.    Blood clots are not common, but are emergencies.  If you have sudden onset pain in your calf or leg, or have sudden shortness of breath, go to the Emergency Department.      Elevation of your operative foot/ankle is key to reducing the swelling in the immediate post-operative phase (first 3-5 days).  When you are at rest, elevate your foot at or above the level of your heart.  When sitting, your foot should be elevated on a chair or stool; not hanging down.      You should plan to rest the day after surgery no matter how minor the procedure.  More complicated procedures will require more time to return to normal activity.      Foot and ankle surgery is painful in most cases - use your medication as directed for the first 24 hours after surgery.  It is not unusual for the pain to be worse a day or two after surgery than on the day of.  If your pain is more than 8/10  contact our office.  If you don t have pain, gradually decrease your pain meds by substituting Tylenol.  Please don t use Advil/ibuprofen unless we order it for you.      You will be prescribed Percocet or Vicodin for pain, Vistaril for spasms/pain adjunct, Senokot for constipation.  If you have had trouble taking these meds before or experience nausea or vomiting after surgery from your medication, please advise the recovery room nurses.  If you are already at home, try drinking only clear liquids and/or call our office.      All pain medications, along with inactivity can cause constipation.  Use the Senakot as directed, increase fluid intake to 1 quart per day and increase your dietary fiber.  (The  P  fruits - peaches, plums, pears, and prunes as well as anise/black licorice are recommended.)    It is not unusual to run a low-grade fever after surgery.  If your temperature is elevated above 102 , lasts longer than 24 hours, or is questionable in any way, contact our office.      Drainage from your cast/dressing is normal.  Reinforce your cast/dressing with 4x4 dressings and cover with an Ace wrap.  Wait until 24 hours after surgery to change your dressings; by this time most of your bleeding will have stopped.  If you have drainage through your dressings 2 days after surgery, contact our office.      You cast/dressing will be changed at your 2-week follow up appointment.  They should be kept clean and DRY.  If your cast/dressing is damaged before that, contact our office.      It is normal to experience some bruising in the toes after surgery and they may appear  dark  when your foot hangs down.  It is important to actively wiggle your toes for at least 5-10 minutes each hour UNLESS you had surgery on those toes.      Use ice on your foot/ankle over the dressing/cast for 20 minutes per hour, 10 or more times per day.  A large bag of frozen peas works well.      Bathing: take a tub or sponge bath instead of a  shower if possible during the first two weeks.  If you choose to shower, cover the dressing/cast with a waterproof covering, these may be ordered from www.seal-tight.com or are available at some pharmacies/medical supply stores.  Another option is XeroSox, which is the original vacuum sealed bandage and cast cover.  The cast cover has a vacuum seal and is absolutely waterproof.  5-565-442-2509 or www.xerosox.[employer] for more information.      Driving:  For surgery on the left foot/ankle, you may drive as soon as narcotic medications are stopped.  For surgery on the right foot/ankle, you may drive when you are out of a cast, off pain medications, and you feel secure with braking.      In general, listen to your foot/ankle.  If you have a sudden, dramatic increase in pain that does not resolve after an hour or so, something is wrong - call our office.  If you fall or bump your foot/ankle and have sudden pain that resolves, give it 24 hours before you call.      Many of your questions can be addressed at your 2-week follow-up appointment - please make a list of things to ask us as they come up during your recovery.      If you had a nerve block it is common to have numbness in your leg and foot for up to 30 hours after surgery.  If your leg or foot is still numb more than 30 hours after surgery, please call the office.      FUTURE DENTIST VISITS:  If you have had a total ankle arthroplasty please be advised you must be on antibiotics prior to ANY dental work.  Please call your dentist office ahead of time and make them aware of this as your dentist will be able to order the prescription.  If you have had any other surgery this is not a concern.    If you have any further questions or concerns, please call our office at [phone]    **If you have questions or concerns about your procedure,   call Dr. Jaeger at [phone]**    [facility]      DISCHARGE INSTRUCTIONS FOLLOWING   REGIONAL BLOCK  "ANESTHESIA      Numbness or lack of feeling in the arm/leg that was operated may last up to 24 hours.  The average time is usually 10-15 hours.  You may not be able to lift or move the arm or leg where the operation was by itself during that time.  Long-acting local anesthetic medicines were used to give you long-lasting pain relief.    Wear a sling until your arm is completely \"awake\"    Avoid bumping your arm, leg or foot while it is numb    Avoid extremes of hot or cold while it is numb    Remain quiet and restful the day of surgery.  Resume normal activities gradually over the next day or so as advise by your surgeon.    Do not drive or operate  Any machinery until your extremity is full  \"awake\"        You will have a tingling and prickly sensation in your arm/leg as the feeling begins to return; you can also expect some discomfort. The amount of discomfort is unpredictable, but if you have more pain that can be controlled with the pain medication you received, you should contact your surgeon.  Start to take your pain pills as soon as you start to feel any discomfort or pain.  We strongly recommend starting your pain medication at bedtime if you haven't already done so.  This is in the event that the block wears off while you are asleep.        Crutch Instructions      Because of your surgery you will need crutches.  Make sure you tell your healthcare provider if crutches do not seem to work for you.  Using Crutches   Crutches are the most commonly used device for injuries to the lower part of the body because they allow the most mobility. All walking assistance devices require strength in your upper body but crutches require more coordination. If you are unable to use crutches then a cane or walker may be better.   Remember these rules when using crutches:   Always look straight ahead when you walk. Do not look down.   Hold the top padded part of the crutches into your chest near your armpits. Grasp the padded " "hand  and always support your weight with your arms and hands.   Do not lean on the crutches with your armpit as this could cause nerve damage.  Standing Up  Make sure you are in a stable chair. Move forward to the edge of the chair so that your  good  foot is flat on the floor. Put both crutches together and hold the handgrips in one hand. Stretch the injured leg out straight and then use the crutches with one hand and the chair armrest with the other hand to push yourself into a standing position onto your  good  leg. Once you are standing, wait until you are steady before placing a crutch in each hand. Until you become good at standing, have someone assist you in case you lose your balance. When standing still, lean slightly forward with the crutches ahead of you and about 3 feet apart. Unless you are told otherwise, you should keep weight off your injured leg.  Walking  To begin crutch walking, balance yourself on your  good  leg. Move both crutches forward about the length of one step and place them firmly on the floor in front of you about 3 feet apart. Support your weight on the padded hand rests while leaning forward. Press the top of the crutches against your chest wall and not into your armpits. Be sure to hold the injured leg up off of the floor. When ready, swing the \"good\" leg forward about one step length past the crutches while supporting your weight on the padded hand . Be careful not to go too far. Transfer your weight onto the \"good\" leg then bring both crutches forward about the length of one step. Repeating this motion will allow you to move fairly quickly without the use of your injured leg. Keep practicing until you become good at crutch walking.  Sitting Down  Make sure the chair you are about to sit on is stable. Walk in front of the chair such that you are facing away from it. Move back a little at a time until the back of your  good  leg is nearly touching the seat. Keeping your " "weight on the  good  leg, move both crutches to one hand holding onto the handgrips. Lean forward, bend your  good  knee, and move your injured leg out forward. Sit down slowly while using your free hand to reach for the chair s armrest for support. Place the crutches where you can easily get them. Never pivot, even on your  good  leg. This could cause you to fall or injure your  good  leg.  Navigating Stairs, Curbs & Door Steps  Only attempt this once you are very comfortable with regular crutch walking and only when someone is there to steady you should you begin to lose your balance. Hold on to a  railing with one hand and both crutches in the other hand or have someone carry the loose crutch for you. It does not matter which side the handrail is on. If there is no handrail, you can use both crutches. Using only crutches is the same as the railing method but maintaining your balance can be difficult. The crutch-only method is not recommended until you have become very good at crutch walking. Be sure to only take one step at a time. A simple rule to remember for crutches when navigating stairs or curbs: up with the  good  leg and down with the  bad .  Going Up Stairs or Curbs  Walk close to the first step with the crutches slightly behind you. Push down on the handrail and the crutch and step up with the \"good\" leg. You may have to make a short hop to do this if you are not allowed to place any weight on the injured leg. Lean forward and bring the injured leg and the crutch up beside the \"good\" leg. Repeat this until you have climbed up all of the steps. Remember, the \"good\" leg goes up first and the crutches move with the injured leg. The person helping you should stand behind and to your side that is away from the railing.  Going Down Stairs or Curbs  Walk to the edge of the first step. While standing and balancing on the  good  leg, place both crutches in one hand and then down on the step below. Be " "sure to support your weight by leaning on the crutches and handrail. Bring the injured leg forward, then the \"good\" leg down to the same step as the crutches. Remember crutches go down first with the injured leg. The person helping you should  front and to your side that is away from the railing.  Sitting Method for Navigating Stairs  A safer way to get up and down stairs is to sit down. To go up steps, sit down on the second or third step. Push with your  good  leg below while pushing up with both arms on the step above to move your bottom to the next step. When you get to the top of the stairs you may need to use the  scooting  method described later to get back up. To go down steps you first need to lower yourself to the floor near the top of the steps. Once seated, support yourself with your  good  leg on the second to next step below, and push with both arms on the step where you are sitting to move your bottom down to the next step. When you reach the bottom, pull yourself up to standing position using your crutches. It is helpful if someone can move your crutches and assist you in getting up and down. With practice it may be possible to manage on your own.  If You Start To Fall  If you start to fall and cannot get your balance, throw the crutches away from you. Try to fall onto your  good  side away from your injury and then break your fall with your arms. If uninjured, you can usually get back up by moving into a sitting position and scooting to a chair. Push with your arms and hands on the chair seat while pushing with the \"good\" leg to get up into the chair. You should not attempt to get back up if you are having significant pain. Call for assistance or dial 911 for an ambulance if necessary.  Safety Tips for Crutch Walking   At first you may want to wear a training belt (or a strong regular belt) so others can assist you.   Do not use crutches if you feel dizzy or drowsy.   Be careful on slick or " wet surfaces like a kitchen or bathroom floor, snow, ice, or rainy conditions.   Temporarily remove pets, throw rugs or other items from your home that might trip you.   Do not hop around while holding onto furniture.   Wear sneakers or rubber soled shoes that will not easily slip or come off.   Be careful on ramps or slopes as they can be harder to walk up or down   Do not remove any parts from your crutches especially the padding or rubber traction footings. Replace padding or footings that become damaged immediately.   It is important to use your crutches correctly. If you feel any numbness or tingling in your arms, you are probably using the crutches incorrectly.  Helpful Hints   A bedside toilet or toilet riser may be helpful.   Always allow for extra time to get around. Children in school should be given permission to leave class early to avoid crowds when changing classes.   Elevate the injured leg when sitting.   Use a backpack to carry books or waist pouch to carry other items so that both hands are free.   Call your healthcare provider if you have any questions or concerns.

## 2017-09-18 NOTE — PROGRESS NOTES
S:  We received an order through [employer] for a Cast shoe in [facility]  OG:  Protect cast/provide bottom surface  A:  I donned a size large cast boot over the R cast.  The fit is adequate.  P:  Pt/staff will contact us with any questions comments and or concerns.

## 2017-09-18 NOTE — ANESTHESIA CARE TRANSFER NOTE
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    Anesthesia Type:   General, LMA, Periph. Nerve Block for postop pain     Note:  Airway :Face Mask  Patient transferred to:PACU  Comments: 1519:  To par responsive, dentition intact.      Vitals: (Last set prior to Anesthesia Care Transfer)    CRNA VITALS  9/18/2017 1447 - 9/18/2017 1517      9/18/2017             Pulse: 83    Ht Rate: 83    SpO2: 98 %    Resp Rate (observed): 16                Electronically Signed By: SON Brown CRNA  September 18, 2017  3:17 PM

## 2017-09-19 NOTE — OP NOTE
DATE OF PROCEDURE:  09/18/2017      PREOPERATIVE DIAGNOSIS:  Nonunion of right talonavicular fusion.      POSTOPERATIVE DIAGNOSIS:  Nonunion of right talonavicular fusion.      PROCEDURES:   1.  Revision right talonavicular fusion augmented with Infuse.   2.  Fusion of the right calcaneocuboid joint.      SURGEON:  Mary Jaeger MD      ASSISTANT:   OSMAN SOLORIO      ANESTHESIA:  General.      PREAMBLE:  Mr. Ned Pimentel presented with a very symptomatic nonunion of right talonavicular fusion.  He tried conservative management to no avail.  Both the screws were broken.  He had a CT scan done that confirmed a nonunion.      Informed consent was obtained for the above-mentioned procedure.      Two grams of Ancef was given prior to surgery.  There is no need for postoperative antibiotic prophylaxis.      DESCRIPTION OF PROCEDURE:  After adequate induction of a general anesthetic, the patient was positioned supine on the operating table.  The right leg was sterilely prepped and free draped in the usual fashion.  Tourniquet around the thigh was inflated to 300 mmHg.  A longitudinal incision was made medial over the right midfoot.  There was significant scar tissue formation.  The broken screw was exposed and removed.  There was a complete nonunion with gross motion of the talonavicular joint.  The deep portion of the screw was not retrieved.      A separate incision was made dorsolaterally over the talonavicular joint and that screw also removed.      The talonavicular joint was then again prepared for fusion.  A third incision was made lateral over the foot to expose the calcaneocuboid joint.  The peroneal tendons as well as the sural nerves were protected.  The calcaneocuboid joint was also prepared for fusion.      The calcaneocuboid joint was then reduced and immobilized with an H plate and four screws with excellent alignment and compression.  Through the medial incision, the talonavicular joint was again  reduced and immobilized with an H-plate and screws.  Through the dorsolateral incision a second plate was placed across the talonavicular joint.  The bone was very soft and osteoporotic and decided that a double plating would be more beneficial.      Infuse was placed medial and lateral to the joint fusion.  After intraoperative x-rays, the tourniquet was deflated and hemostasis obtained.  There was a stable repair.      The wounds were closed in layers.  A sterile dressing and a light compressive bandage were applied, followed by a cast splint.  He tolerated the procedure well and was taken to the recovery room in satisfactory condition.      He can ambulate toe-touch weightbearing with crutches.  Sutures will be removed in 2 weeks.         PAULO URBINA MD             D: 2017 15:21   T: 2017 19:21   MT: [id number]#[id number]      Name:     NILE MELO   MRN:      [id number]        Account:        [id number]   :      1955           Procedure Date: 2017      Document: [id number]

## 2019-08-23 ENCOUNTER — HOSPITAL ENCOUNTER (OUTPATIENT)
Dept: MRI IMAGING | Facility: HOSPITAL | Age: 64
Discharge: HOME | End: 2019-08-23
Attending: FAMILY MEDICINE | Admitting: FAMILY MEDICINE
Payer: COMMERCIAL

## 2019-08-23 DIAGNOSIS — M75.102 ROTATOR CUFF TEAR ARTHROPATHY OF LEFT SHOULDER: ICD-10-CM

## 2019-08-23 DIAGNOSIS — M12.812 ROTATOR CUFF TEAR ARTHROPATHY OF LEFT SHOULDER: ICD-10-CM

## 2019-08-23 DIAGNOSIS — M12.812 ROTATOR CUFF ARTHROPATHY, LEFT: ICD-10-CM

## 2019-08-23 PROCEDURE — 73221 MRI JOINT UPR EXTREM W/O DYE: CPT | Mod: TC,LT

## 2019-09-27 ENCOUNTER — HOSPITAL ENCOUNTER (OUTPATIENT)
Dept: MRI IMAGING | Facility: HOSPITAL | Age: 64
Discharge: HOME | End: 2019-09-27
Attending: FAMILY MEDICINE | Admitting: FAMILY MEDICINE
Payer: COMMERCIAL

## 2019-09-27 DIAGNOSIS — R93.7 ABNORMAL FINDINGS ON DIAGNOSTIC IMAGING OF OTHER PARTS OF MUSCULOSKELETAL SYSTEM: ICD-10-CM

## 2019-09-27 DIAGNOSIS — R29.898 LEFT ARM WEAKNESS: ICD-10-CM

## 2019-09-27 DIAGNOSIS — M54.2 NECK PAIN: ICD-10-CM

## 2019-09-27 PROCEDURE — 72141 MRI NECK SPINE W/O DYE: CPT | Mod: TC

## 2020-11-04 ENCOUNTER — MEDICAL CORRESPONDENCE (OUTPATIENT)
Dept: MRI IMAGING | Facility: HOSPITAL | Age: 65
End: 2020-11-04

## 2020-11-19 ENCOUNTER — HOSPITAL ENCOUNTER (OUTPATIENT)
Dept: MRI IMAGING | Facility: HOSPITAL | Age: 65
Discharge: HOME | End: 2020-11-19
Admitting: NURSE PRACTITIONER
Payer: COMMERCIAL

## 2020-11-19 DIAGNOSIS — M54.12 CERVICAL RADICULOPATHY: ICD-10-CM

## 2020-11-19 DIAGNOSIS — R20.2 TINGLING: ICD-10-CM

## 2020-11-19 PROCEDURE — 72141 MRI NECK SPINE W/O DYE: CPT

## 2022-01-07 ENCOUNTER — HOSPITAL ENCOUNTER (EMERGENCY)
Facility: HOSPITAL | Age: 67
Discharge: HOME | End: 2022-01-07
Attending: INTERNAL MEDICINE | Admitting: INTERNAL MEDICINE
Payer: COMMERCIAL

## 2022-01-07 VITALS
OXYGEN SATURATION: 93 % | DIASTOLIC BLOOD PRESSURE: 78 MMHG | TEMPERATURE: 96.8 F | HEART RATE: 74 BPM | WEIGHT: 222 LBS | SYSTOLIC BLOOD PRESSURE: 116 MMHG | RESPIRATION RATE: 16 BRPM | BODY MASS INDEX: 30.96 KG/M2

## 2022-01-07 DIAGNOSIS — R19.7 DIARRHEA, UNSPECIFIED TYPE: ICD-10-CM

## 2022-01-07 LAB
ALBUMIN SERPL-MCNC: 4.1 G/DL (ref 3.4–5)
ALP SERPL-CCNC: 89 U/L (ref 40–150)
ALT SERPL W P-5'-P-CCNC: 39 U/L (ref 0–70)
ANION GAP SERPL CALCULATED.3IONS-SCNC: 5 MMOL/L (ref 3–14)
AST SERPL W P-5'-P-CCNC: 24 U/L (ref 0–45)
BASOPHILS # BLD AUTO: 0 10E3/UL (ref 0–0.2)
BASOPHILS NFR BLD AUTO: 0 %
BILIRUB DIRECT SERPL-MCNC: 0.2 MG/DL (ref 0–0.2)
BILIRUB SERPL-MCNC: 0.9 MG/DL (ref 0.2–1.3)
BUN SERPL-MCNC: 19 MG/DL (ref 7–30)
C DIFF TOX B STL QL: NEGATIVE
CALCIUM SERPL-MCNC: 8.5 MG/DL (ref 8.5–10.1)
CHLORIDE BLD-SCNC: 111 MMOL/L (ref 94–109)
CO2 SERPL-SCNC: 18 MMOL/L (ref 20–32)
CREAT SERPL-MCNC: 1.74 MG/DL (ref 0.66–1.25)
CRP SERPL-MCNC: 3.4 MG/L (ref 0–8)
EOSINOPHIL # BLD AUTO: 0.3 10E3/UL (ref 0–0.7)
EOSINOPHIL NFR BLD AUTO: 3 %
ERYTHROCYTE [DISTWIDTH] IN BLOOD BY AUTOMATED COUNT: 13.2 % (ref 10–15)
GFR SERPL CREATININE-BSD FRML MDRD: 43 ML/MIN/1.73M2
GLUCOSE BLD-MCNC: 102 MG/DL (ref 70–99)
HCT VFR BLD AUTO: 50.1 % (ref 40–53)
HGB BLD-MCNC: 17.9 G/DL (ref 13.3–17.7)
HOLD SPECIMEN: NORMAL
HOLD SPECIMEN: NORMAL
IMM GRANULOCYTES # BLD: 0.1 10E3/UL
IMM GRANULOCYTES NFR BLD: 1 %
LYMPHOCYTES # BLD AUTO: 1.8 10E3/UL (ref 0.8–5.3)
LYMPHOCYTES NFR BLD AUTO: 17 %
MCH RBC QN AUTO: 31 PG (ref 26.5–33)
MCHC RBC AUTO-ENTMCNC: 35.7 G/DL (ref 31.5–36.5)
MCV RBC AUTO: 87 FL (ref 78–100)
MONOCYTES # BLD AUTO: 1 10E3/UL (ref 0–1.3)
MONOCYTES NFR BLD AUTO: 9 %
NEUTROPHILS # BLD AUTO: 7.6 10E3/UL (ref 1.6–8.3)
NEUTROPHILS NFR BLD AUTO: 70 %
NRBC # BLD AUTO: 0 10E3/UL
NRBC BLD AUTO-RTO: 0 /100
PLATELET # BLD AUTO: 255 10E3/UL (ref 150–450)
POTASSIUM BLD-SCNC: 3.3 MMOL/L (ref 3.4–5.3)
PROT SERPL-MCNC: 7.4 G/DL (ref 6.8–8.8)
RBC # BLD AUTO: 5.77 10E6/UL (ref 4.4–5.9)
SODIUM SERPL-SCNC: 134 MMOL/L (ref 133–144)
WBC # BLD AUTO: 10.8 10E3/UL (ref 4–11)

## 2022-01-07 PROCEDURE — 87493 C DIFF AMPLIFIED PROBE: CPT | Performed by: INTERNAL MEDICINE

## 2022-01-07 PROCEDURE — 99284 EMERGENCY DEPT VISIT MOD MDM: CPT | Performed by: PHYSICIAN ASSISTANT

## 2022-01-07 PROCEDURE — 86140 C-REACTIVE PROTEIN: CPT | Performed by: INTERNAL MEDICINE

## 2022-01-07 PROCEDURE — 87506 IADNA-DNA/RNA PROBE TQ 6-11: CPT | Performed by: INTERNAL MEDICINE

## 2022-01-07 PROCEDURE — 85025 COMPLETE CBC W/AUTO DIFF WBC: CPT | Performed by: INTERNAL MEDICINE

## 2022-01-07 PROCEDURE — 85025 COMPLETE CBC W/AUTO DIFF WBC: CPT | Performed by: EMERGENCY MEDICINE

## 2022-01-07 PROCEDURE — 82310 ASSAY OF CALCIUM: CPT | Performed by: EMERGENCY MEDICINE

## 2022-01-07 PROCEDURE — 80048 BASIC METABOLIC PNL TOTAL CA: CPT | Performed by: INTERNAL MEDICINE

## 2022-01-07 PROCEDURE — 36415 COLL VENOUS BLD VENIPUNCTURE: CPT | Performed by: EMERGENCY MEDICINE

## 2022-01-07 PROCEDURE — 99283 EMERGENCY DEPT VISIT LOW MDM: CPT

## 2022-01-07 PROCEDURE — 82248 BILIRUBIN DIRECT: CPT | Performed by: INTERNAL MEDICINE

## 2022-01-07 NOTE — ED TRIAGE NOTES
Diarrhea x 12 days and vomited last night.  No fever or cough.  Has taken flagyl for the last 2 days but hasn't improved much.  Had covid November 2020 and unvaccinated.

## 2022-01-08 NOTE — ED NOTES
Diarrhea for the last week, started on meds yesterday (metroniznide). Had been taking imodium without relief.

## 2022-01-10 ASSESSMENT — ENCOUNTER SYMPTOMS
CHILLS: 1
DIARRHEA: 1
FEVER: 0
CONFUSION: 0
NECK PAIN: 0
NUMBNESS: 0
DIZZINESS: 0
MYALGIAS: 0
BLOOD IN STOOL: 0
LIGHT-HEADEDNESS: 0
SLEEP DISTURBANCE: 0
DYSURIA: 0
ABDOMINAL PAIN: 0
VOICE CHANGE: 0
NAUSEA: 0
BACK PAIN: 0
ANAL BLEEDING: 0
ABDOMINAL DISTENTION: 0
PALPITATIONS: 0
FREQUENCY: 0
HEADACHES: 0
WEAKNESS: 0
COLOR CHANGE: 0
COUGH: 0
SHORTNESS OF BREATH: 0
WHEEZING: 0
FLANK PAIN: 0
VOMITING: 1
DIAPHORESIS: 0
CHEST TIGHTNESS: 0

## 2022-01-10 NOTE — ED PROVIDER NOTES
History     Chief Complaint   Patient presents with     Nausea, Vomiting, & Diarrhea     The history is provided by the patient.   Diarrhea  Quality:  Watery  Severity:  Moderate  Onset quality:  Gradual  Number of episodes:  3  Duration:  2 weeks  Timing:  Intermittent  Progression:  Improving  Relieved by:  Nothing  Associated symptoms: chills and vomiting    Associated symptoms: no abdominal pain, no diaphoresis, no fever, no headaches and no myalgias        Allergies:  No Known Allergies    Problem List:    There are no problems to display for this patient.       Past Medical History:    Past Medical History:   Diagnosis Date     ED (erectile dysfunction)      Gout      Hypertension      Neuropathy      Upper GI bleed        Past Surgical History:    Past Surgical History:   Procedure Laterality Date     ARTHRODESIS ANKLE Right 9/18/2017    Procedure: ARTHRODESIS ANKLE;  REVISION TALONAVICULAR FUSION AND CALCANEALCUBOID FUSION (C-ARM,H PLATES, INFUSE);  Surgeon: Mary Jaeger MD;  Location: [facility]     arthroscopic knee surgery       HERNIA REPAIR      umbilical     ORTHOPEDIC SURGERY Right     foot surgery     SHOULDER SURGERY       SINUS SURGERY         Family History:    Family History   Problem Relation Age of Onset     Heart Disease Father         2 heart attacks by age 61     Heart Disease Brother         heart attack age 50       Social History:  Marital Status:   [2]  Social History     Tobacco Use     Smoking status: Never Smoker     Smokeless tobacco: Never Used   Substance Use Topics     Alcohol use: No     Drug use: No        Medications:    AMLODIPINE BESYLATE PO  ASPIRIN PO  hydrOXYzine (ATARAX) 25 MG tablet  losartan-hydrochlorothiazide (HYZAAR) 100-12.5 MG per tablet  ondansetron (ZOFRAN-ODT) 4 MG ODT tab  oxyCODONE-acetaminophen (PERCOCET) 5-325 MG per tablet  senna-docusate (SENOKOT-S;PERICOLACE) 8.6-50 MG per tablet  SILDENAFIL CITRATE PO  SUMAtriptan Succinate (IMITREX  PO)  Tadalafil (CIALIS PO)          Review of Systems   Constitutional: Positive for chills. Negative for diaphoresis and fever.   HENT: Negative for voice change.    Eyes: Negative for visual disturbance.   Respiratory: Negative for cough, chest tightness, shortness of breath and wheezing.    Cardiovascular: Negative for chest pain, palpitations and leg swelling.   Gastrointestinal: Positive for diarrhea and vomiting. Negative for abdominal distention, abdominal pain, anal bleeding, blood in stool and nausea.   Genitourinary: Negative for decreased urine volume, dysuria, flank pain and frequency.   Musculoskeletal: Negative for back pain, gait problem, myalgias and neck pain.   Skin: Negative for color change, pallor and rash.   Neurological: Negative for dizziness, syncope, weakness, light-headedness, numbness and headaches.   Psychiatric/Behavioral: Negative for confusion, sleep disturbance and suicidal ideas.       Physical Exam   BP: 114/74  Pulse: 85  Temp: 96.8  F (36  C)  Resp: 16  Weight: 100.7 kg (222 lb)  SpO2: 97 %      Physical Exam  Vitals and nursing note reviewed.   Constitutional:       Appearance: He is well-developed.   HENT:      Head: Normocephalic and atraumatic.   Eyes:      Conjunctiva/sclera: Conjunctivae normal.      Pupils: Pupils are equal, round, and reactive to light.   Neck:      Thyroid: No thyromegaly.      Vascular: No JVD.      Trachea: No tracheal deviation.   Cardiovascular:      Rate and Rhythm: Normal rate and regular rhythm.      Heart sounds: Normal heart sounds. No murmur heard.  No gallop.    Pulmonary:      Effort: Pulmonary effort is normal. No respiratory distress.      Breath sounds: Normal breath sounds. No stridor. No wheezing or rales.   Chest:      Chest wall: No tenderness.   Abdominal:      General: Bowel sounds are normal. There is no distension.      Palpations: Abdomen is soft. There is no mass.      Tenderness: There is no abdominal tenderness. There is no  guarding or rebound.   Musculoskeletal:         General: No tenderness. Normal range of motion.      Cervical back: Normal range of motion and neck supple.   Lymphadenopathy:      Cervical: No cervical adenopathy.   Skin:     General: Skin is warm.      Coloration: Skin is not pale.      Findings: No erythema or rash.   Neurological:      Mental Status: He is alert and oriented to person, place, and time.   Psychiatric:         Behavior: Behavior normal.         ED Course                 Procedures                  No results found for this or any previous visit (from the past 24 hour(s)).    Medications - No data to display    Assessments & Plan (with Medical Decision Making)   Watery diarrhea for 2 wk , actually stopped since yesterday after taking a flagyl pill  Had vomiting in first wk but then resolved  Labs reviewed, elevated BUN c , likely chronic  Pt drank a bottle of Pedialyte in ER withoout problem, I advised continue oral hydation athome, follow-up with PCP for recheck labs and test kidney function, return to ER if symtoms recurred, he undrestood and agreed.     I have reviewed the nursing notes.    I have reviewed the findings, diagnosis, plan and need for follow up with the patient.      Discharge Medication List as of 1/7/2022  9:11 PM          Final diagnoses:   Diarrhea, unspecified type       1/7/2022   [facility]     Román Scott MD  01/10/22 0114

## 2022-01-22 ENCOUNTER — HEALTH MAINTENANCE LETTER (OUTPATIENT)
Age: 67
End: 2022-01-22

## 2022-02-01 ENCOUNTER — MEDICAL CORRESPONDENCE (OUTPATIENT)
Dept: CT IMAGING | Facility: HOSPITAL | Age: 67
End: 2022-02-01
Payer: COMMERCIAL

## 2022-02-03 ENCOUNTER — APPOINTMENT (OUTPATIENT)
Dept: LAB | Facility: HOSPITAL | Age: 67
End: 2022-02-03
Payer: COMMERCIAL

## 2022-02-03 ENCOUNTER — HOSPITAL ENCOUNTER (OUTPATIENT)
Dept: CT IMAGING | Facility: HOSPITAL | Age: 67
End: 2022-02-03
Attending: INTERNAL MEDICINE
Payer: COMMERCIAL

## 2022-02-03 DIAGNOSIS — R63.4 WEIGHT LOSS, UNINTENTIONAL: ICD-10-CM

## 2022-02-03 PROCEDURE — 74177 CT ABD & PELVIS W/CONTRAST: CPT

## 2022-02-03 PROCEDURE — 250N000011 HC RX IP 250 OP 636: Performed by: RADIOLOGY

## 2022-02-03 RX ORDER — IOPAMIDOL 612 MG/ML
100 INJECTION, SOLUTION INTRAVASCULAR ONCE
Status: DISCONTINUED | OUTPATIENT
Start: 2022-02-03 | End: 2022-02-04 | Stop reason: HOSPADM

## 2022-02-03 RX ORDER — IOPAMIDOL 755 MG/ML
109 INJECTION, SOLUTION INTRAVASCULAR ONCE
Status: COMPLETED | OUTPATIENT
Start: 2022-02-03 | End: 2022-02-03

## 2022-02-03 RX ADMIN — IOPAMIDOL 100 ML: 755 INJECTION, SOLUTION INTRAVENOUS at 11:23

## 2022-08-17 ENCOUNTER — APPOINTMENT (OUTPATIENT)
Dept: GENERAL RADIOLOGY | Facility: HOSPITAL | Age: 67
End: 2022-08-17
Attending: EMERGENCY MEDICINE
Payer: COMMERCIAL

## 2022-08-17 ENCOUNTER — HOSPITAL ENCOUNTER (OUTPATIENT)
Facility: HOSPITAL | Age: 67
Setting detail: OBSERVATION
Discharge: HOME | End: 2022-08-19
Attending: EMERGENCY MEDICINE | Admitting: INTERNAL MEDICINE
Payer: COMMERCIAL

## 2022-08-17 ENCOUNTER — APPOINTMENT (OUTPATIENT)
Dept: CT IMAGING | Facility: HOSPITAL | Age: 67
End: 2022-08-17
Attending: EMERGENCY MEDICINE
Payer: COMMERCIAL

## 2022-08-17 DIAGNOSIS — S76.312A STRAIN OF LEFT HAMSTRING MUSCLE, INITIAL ENCOUNTER: ICD-10-CM

## 2022-08-17 DIAGNOSIS — S62.522B: Primary | ICD-10-CM

## 2022-08-17 LAB
ALBUMIN SERPL-MCNC: 3.4 G/DL (ref 3.4–5)
ALP SERPL-CCNC: 71 U/L (ref 40–150)
ALT SERPL W P-5'-P-CCNC: 24 U/L (ref 0–70)
ANION GAP SERPL CALCULATED.3IONS-SCNC: 6 MMOL/L (ref 3–14)
AST SERPL W P-5'-P-CCNC: 22 U/L (ref 0–45)
BASOPHILS # BLD AUTO: 0 10E3/UL (ref 0–0.2)
BASOPHILS NFR BLD AUTO: 0 %
BILIRUB SERPL-MCNC: 0.6 MG/DL (ref 0.2–1.3)
BUN SERPL-MCNC: 15 MG/DL (ref 7–30)
CALCIUM SERPL-MCNC: 8.1 MG/DL (ref 8.5–10.1)
CHLORIDE BLD-SCNC: 109 MMOL/L (ref 94–109)
CO2 SERPL-SCNC: 24 MMOL/L (ref 20–32)
CREAT SERPL-MCNC: 1.32 MG/DL (ref 0.66–1.25)
EOSINOPHIL # BLD AUTO: 0.2 10E3/UL (ref 0–0.7)
EOSINOPHIL NFR BLD AUTO: 2 %
ERYTHROCYTE [DISTWIDTH] IN BLOOD BY AUTOMATED COUNT: 13.2 % (ref 10–15)
FLUAV RNA SPEC QL NAA+PROBE: NEGATIVE
FLUBV RNA RESP QL NAA+PROBE: NEGATIVE
GFR SERPL CREATININE-BSD FRML MDRD: 59 ML/MIN/1.73M2
GLUCOSE BLD-MCNC: 104 MG/DL (ref 70–99)
HCT VFR BLD AUTO: 39 % (ref 40–53)
HGB BLD-MCNC: 13.7 G/DL (ref 13.3–17.7)
HOLD SPECIMEN: NORMAL
IMM GRANULOCYTES # BLD: 0 10E3/UL
IMM GRANULOCYTES NFR BLD: 0 %
LYMPHOCYTES # BLD AUTO: 0.9 10E3/UL (ref 0.8–5.3)
LYMPHOCYTES NFR BLD AUTO: 11 %
MCH RBC QN AUTO: 30.7 PG (ref 26.5–33)
MCHC RBC AUTO-ENTMCNC: 35.1 G/DL (ref 31.5–36.5)
MCV RBC AUTO: 87 FL (ref 78–100)
MONOCYTES # BLD AUTO: 0.8 10E3/UL (ref 0–1.3)
MONOCYTES NFR BLD AUTO: 9 %
NEUTROPHILS # BLD AUTO: 6.8 10E3/UL (ref 1.6–8.3)
NEUTROPHILS NFR BLD AUTO: 78 %
NRBC # BLD AUTO: 0 10E3/UL
NRBC BLD AUTO-RTO: 0 /100
PLATELET # BLD AUTO: 156 10E3/UL (ref 150–450)
POTASSIUM BLD-SCNC: 3.9 MMOL/L (ref 3.4–5.3)
PROT SERPL-MCNC: 6.1 G/DL (ref 6.8–8.8)
RBC # BLD AUTO: 4.46 10E6/UL (ref 4.4–5.9)
RSV RNA SPEC NAA+PROBE: NEGATIVE
SARS-COV-2 RNA RESP QL NAA+PROBE: NEGATIVE
SODIUM SERPL-SCNC: 139 MMOL/L (ref 133–144)
WBC # BLD AUTO: 8.7 10E3/UL (ref 4–11)

## 2022-08-17 PROCEDURE — 73700 CT LOWER EXTREMITY W/O DYE: CPT | Mod: LT

## 2022-08-17 PROCEDURE — 12001 RPR S/N/AX/GEN/TRNK 2.5CM/<: CPT | Performed by: EMERGENCY MEDICINE

## 2022-08-17 PROCEDURE — 96375 TX/PRO/DX INJ NEW DRUG ADDON: CPT | Mod: XU

## 2022-08-17 PROCEDURE — C9803 HOPD COVID-19 SPEC COLLECT: HCPCS

## 2022-08-17 PROCEDURE — 80053 COMPREHEN METABOLIC PANEL: CPT | Performed by: EMERGENCY MEDICINE

## 2022-08-17 PROCEDURE — G0378 HOSPITAL OBSERVATION PER HR: HCPCS

## 2022-08-17 PROCEDURE — 96365 THER/PROPH/DIAG IV INF INIT: CPT

## 2022-08-17 PROCEDURE — 87637 SARSCOV2&INF A&B&RSV AMP PRB: CPT | Performed by: EMERGENCY MEDICINE

## 2022-08-17 PROCEDURE — 250N000011 HC RX IP 250 OP 636: Performed by: EMERGENCY MEDICINE

## 2022-08-17 PROCEDURE — 12001 RPR S/N/AX/GEN/TRNK 2.5CM/<: CPT

## 2022-08-17 PROCEDURE — 73140 X-RAY EXAM OF FINGER(S): CPT | Mod: LT

## 2022-08-17 PROCEDURE — 36415 COLL VENOUS BLD VENIPUNCTURE: CPT | Performed by: EMERGENCY MEDICINE

## 2022-08-17 PROCEDURE — 90471 IMMUNIZATION ADMIN: CPT | Performed by: EMERGENCY MEDICINE

## 2022-08-17 PROCEDURE — 85025 COMPLETE CBC W/AUTO DIFF WBC: CPT | Performed by: EMERGENCY MEDICINE

## 2022-08-17 PROCEDURE — 99285 EMERGENCY DEPT VISIT HI MDM: CPT | Mod: 25 | Performed by: EMERGENCY MEDICINE

## 2022-08-17 PROCEDURE — 99285 EMERGENCY DEPT VISIT HI MDM: CPT | Mod: 25

## 2022-08-17 PROCEDURE — 90715 TDAP VACCINE 7 YRS/> IM: CPT | Performed by: EMERGENCY MEDICINE

## 2022-08-17 PROCEDURE — 96376 TX/PRO/DX INJ SAME DRUG ADON: CPT

## 2022-08-17 PROCEDURE — 250N000013 HC RX MED GY IP 250 OP 250 PS 637: Performed by: EMERGENCY MEDICINE

## 2022-08-17 RX ORDER — CEFAZOLIN SODIUM 1 G/50ML
1 INJECTION, SOLUTION INTRAVENOUS ONCE
Status: COMPLETED | OUTPATIENT
Start: 2022-08-17 | End: 2022-08-17

## 2022-08-17 RX ORDER — FENTANYL CITRATE 50 UG/ML
50 INJECTION, SOLUTION INTRAMUSCULAR; INTRAVENOUS ONCE
Status: COMPLETED | OUTPATIENT
Start: 2022-08-17 | End: 2022-08-17

## 2022-08-17 RX ORDER — KETOROLAC TROMETHAMINE 15 MG/ML
15 INJECTION, SOLUTION INTRAMUSCULAR; INTRAVENOUS ONCE
Status: COMPLETED | OUTPATIENT
Start: 2022-08-17 | End: 2022-08-17

## 2022-08-17 RX ORDER — CYCLOBENZAPRINE HCL 10 MG
10 TABLET ORAL 3 TIMES DAILY PRN
Qty: 20 TABLET | Refills: 0 | Status: SHIPPED | OUTPATIENT
Start: 2022-08-17 | End: 2022-08-23

## 2022-08-17 RX ORDER — HYDROCODONE BITARTRATE AND ACETAMINOPHEN 5; 325 MG/1; MG/1
1 TABLET ORAL EVERY 6 HOURS PRN
Qty: 18 TABLET | Refills: 0 | Status: SHIPPED | OUTPATIENT
Start: 2022-08-17 | End: 2022-08-20

## 2022-08-17 RX ORDER — CEPHALEXIN 500 MG/1
500 CAPSULE ORAL 4 TIMES DAILY
Qty: 28 CAPSULE | Refills: 0 | Status: SHIPPED | OUTPATIENT
Start: 2022-08-17 | End: 2022-08-24

## 2022-08-17 RX ORDER — CYCLOBENZAPRINE HCL 10 MG
10 TABLET ORAL ONCE
Status: COMPLETED | OUTPATIENT
Start: 2022-08-17 | End: 2022-08-17

## 2022-08-17 RX ADMIN — KETOROLAC TROMETHAMINE 15 MG: 15 INJECTION, SOLUTION INTRAMUSCULAR; INTRAVENOUS at 15:50

## 2022-08-17 RX ADMIN — CLOSTRIDIUM TETANI TOXOID ANTIGEN (FORMALDEHYDE INACTIVATED), CORYNEBACTERIUM DIPHTHERIAE TOXOID ANTIGEN (FORMALDEHYDE INACTIVATED), BORDETELLA PERTUSSIS TOXOID ANTIGEN (GLUTARALDEHYDE INACTIVATED), BORDETELLA PERTUSSIS FILAMENTOUS HEMAGGLUTININ ANTIGEN (FORMALDEHYDE INACTIVATED), BORDETELLA PERTUSSIS PERTACTIN ANTIGEN, AND BORDETELLA PERTUSSIS FIMBRIAE 2/3 ANTIGEN 0.5 ML: 5; 2; 2.5; 5; 3; 5 INJECTION, SUSPENSION INTRAMUSCULAR at 12:47

## 2022-08-17 RX ADMIN — FENTANYL CITRATE 50 MCG: 50 INJECTION, SOLUTION INTRAMUSCULAR; INTRAVENOUS at 12:47

## 2022-08-17 RX ADMIN — CEFAZOLIN SODIUM 1 G: 1 INJECTION, SOLUTION INTRAVENOUS at 13:12

## 2022-08-17 RX ADMIN — FENTANYL CITRATE 50 MCG: 50 INJECTION, SOLUTION INTRAMUSCULAR; INTRAVENOUS at 11:47

## 2022-08-17 RX ADMIN — CYCLOBENZAPRINE HYDROCHLORIDE 10 MG: 10 TABLET, FILM COATED ORAL at 16:34

## 2022-08-17 RX ADMIN — HYDROMORPHONE HYDROCHLORIDE 1 MG: 1 INJECTION, SOLUTION INTRAMUSCULAR; INTRAVENOUS; SUBCUTANEOUS at 18:57

## 2022-08-17 RX ADMIN — FENTANYL CITRATE 50 MCG: 50 INJECTION, SOLUTION INTRAMUSCULAR; INTRAVENOUS at 15:46

## 2022-08-17 ASSESSMENT — ACTIVITIES OF DAILY LIVING (ADL)
ADLS_ACUITY_SCORE: 35
ADLS_ACUITY_SCORE: 35
ADLS_ACUITY_SCORE: 37
ADLS_ACUITY_SCORE: 35

## 2022-08-17 ASSESSMENT — ENCOUNTER SYMPTOMS
EYES NEGATIVE: 1
HEMATOLOGIC/LYMPHATIC NEGATIVE: 1
NEUROLOGICAL NEGATIVE: 1
CARDIOVASCULAR NEGATIVE: 1
GASTROINTESTINAL NEGATIVE: 1
RESPIRATORY NEGATIVE: 1
ENDOCRINE NEGATIVE: 1
CONSTITUTIONAL NEGATIVE: 1

## 2022-08-17 NOTE — ED TRIAGE NOTES
Patient presents with EMS as he had been outside and there were bees he was running from and he slipped and fell on the wet grass. Wife called EMS as he states he pulled something in his left back of his thigh. He also managed to somehow almost sever his left thumb per EMS. Per EMS he was hypotensive as well as almost passing out.

## 2022-08-17 NOTE — DISCHARGE INSTRUCTIONS
Appointments: [facility] will be calling you at home to schedule a Follow-up appointment if they do not contact you by the end of the day today please call [phone] to get an appointment scheduled.

## 2022-08-17 NOTE — ED PROVIDER NOTES
"  History     Chief Complaint   Patient presents with     Leg Pain     Laceration     HPI  Ned Pimentel is a 67 year old male who is transported by EMS after having suffered a fall today.  He states that he was \"running from hornets\".  He slipped on the wet grass and fell.  He states \"I pulled a muscle in my left leg\".  He also injured his left thumb.  He his laceration and obvious deformity to his thumb.  He denies any struck his head.  There was no loss of consciousness.  He denies neck thoracic or lumbar back pain.  He denies any discomfort in his chest and denies any abdominal pain.    Allergies:  No Known Allergies    Problem List:    There are no problems to display for this patient.       Past Medical History:    Past Medical History:   Diagnosis Date     ED (erectile dysfunction)      Gout      Hypertension      Neuropathy      Upper GI bleed        Past Surgical History:    Past Surgical History:   Procedure Laterality Date     ARTHRODESIS ANKLE Right 9/18/2017    Procedure: ARTHRODESIS ANKLE;  REVISION TALONAVICULAR FUSION AND CALCANEALCUBOID FUSION (C-ARM,H PLATES, INFUSE);  Surgeon: Mary Jaeger MD;  Location: [facility]     arthroscopic knee surgery       HERNIA REPAIR      umbilical     ORTHOPEDIC SURGERY Right     foot surgery     SHOULDER SURGERY       SINUS SURGERY         Family History:    Family History   Problem Relation Age of Onset     Heart Disease Father         2 heart attacks by age 61     Heart Disease Brother         heart attack age 50       Social History:  Marital Status:   [2]  Social History     Tobacco Use     Smoking status: Never Smoker     Smokeless tobacco: Never Used   Substance Use Topics     Alcohol use: No     Drug use: No        Medications:    AMLODIPINE BESYLATE PO  ASPIRIN PO  cephALEXin (KEFLEX) 500 MG capsule  cyclobenzaprine (FLEXERIL) 10 MG tablet  HYDROcodone-acetaminophen (NORCO) 5-325 MG tablet  losartan-hydrochlorothiazide (HYZAAR) 100-12.5 MG per " tablet  hydrOXYzine (ATARAX) 25 MG tablet  ondansetron (ZOFRAN-ODT) 4 MG ODT tab  oxyCODONE-acetaminophen (PERCOCET) 5-325 MG per tablet  senna-docusate (SENOKOT-S;PERICOLACE) 8.6-50 MG per tablet  SILDENAFIL CITRATE PO  SUMAtriptan Succinate (IMITREX PO)  Tadalafil (CIALIS PO)          Review of Systems   Constitutional: Negative.    HENT: Negative.    Eyes: Negative.    Respiratory: Negative.    Cardiovascular: Negative.    Gastrointestinal: Negative.    Endocrine: Negative.    Genitourinary: Negative.    Musculoskeletal:        See history of chief complaint   Neurological: Negative.    Hematological: Negative.    All other systems reviewed and are found unremarkable    Physical Exam   BP: 95/68  Pulse: 61  Temp: 98.9  F (37.2  C)  Resp: 14  SpO2: 98 %      Physical Exam 67-year-old gentleman who is awake alert oriented person place and time.  He is very pleasant and cooperative with my exam.  HEENT normocephalic atraumatic extraocular muscles intact pupils equally round and reactive to light and oropharynx clear.  Neck supple for range of motion without pain or palpable tenderness.  Lungs clear bilaterally.  Heart maintains a regular rate and rhythm S1 and S2 sounds appreciated.  Abdomen is soft is nontender no mass organomegaly rebound extremities patient he has a 2 cm laceration that extends across the cuticle and the dorsal aspect of his thumb.  The distal phalanx is displaced slightly in the palmar direction.  There is some foreign matter noted in the wound.  Patient has brisk capillary refill no sensory deficit to light touch in the thumb.  Patient is able to overcome discomfort and extend his distal phalanx of his thumb however.  He has full range of motion of the other digits of his left hand and no injury to his right upper extremity.  Examination of left upper extremity reveals range of motion the knee ankle and digits of the foot.  Patient does have mild to moderate soft tissue swelling in the  posterior distal aspect of his thigh tenderness to palpation.  Right lower extremity appears uninjured.  Neurologic exam no focal deficit.  Dermatologic exam no diffuse skin rashes or lesions    ED Course              ED Course as of 08/17/22 1808   Wed Aug 17, 2022   1438 Discussed the case with Dr. Garcia, on call orthopedics, who recommends pain control oral antibiotics splinting and close outpatient follow-up.   1757 Patient was unable to ambulate.  He tried crutches.  He also quite a bit of difficulty trying to sit up in the bed as his leg was quite painful and he really could not ambulate.  I discussed the case with Dr. Holm who is on-call hospitalist who very graciously agreed to make the patient an observation admit.  There are no beds available upstairs he will be an ER boarder.     [facility]    -Laceration Repair    Date/Time: 8/17/2022 3:38 PM  Performed by: Gómez Tomas DO  Authorized by: Gómez Tomas DO     Risks, benefits and alternatives discussed.      ANESTHESIA (see MAR for exact dosages):     Anesthesia method:  Local infiltration and nerve block    Local anesthetic:  Lidocaine 2% w/o epi    Block location:  Left thumb    Block needle gauge:  27 G    Block anesthetic:  Lidocaine 2% w/o epi    Block injection procedure:  Anatomic landmarks identified and incremental injection    Block outcome:  Anesthesia achieved      LACERATION DETAILS     Location:  Finger    Finger location:  L thumb    Length (cm):  2    REPAIR TYPE:     Repair type:  Simple      EXPLORATION:     Hemostasis achieved with:  Direct pressure    Wound exploration: wound explored through full range of motion and entire depth of wound probed and visualized      Contaminated: yes      TREATMENT:     Area cleansed with:  Saline    Amount of cleaning:  Extensive    Irrigation solution:  Sterile saline    Irrigation method:  Syringe    Visualized foreign bodies/material removed: yes      SKIN REPAIR      Repair method:  Sutures    Suture size:  5-0    Suture material:  Nylon    Suture technique:  Simple interrupted    APPROXIMATION     Approximation:  Close    POST-PROCEDURE DETAILS     Dressing:  Antibiotic ointment, non-adherent dressing, bulky dressing and splint for protection        PROCEDURE    Patient Tolerance:  Patient tolerated the procedure well with no immediate complications                      Results for orders placed or performed during the hospital encounter of 08/17/22 (from the past 24 hour(s))   XR Finger Left 2 Views    Narrative    PROCEDURE:  XR FINGER LEFT G/E 2 VIEWS    HISTORY: trauma    COMPARISON:  None.    TECHNIQUE:  3 view left finger.    FINDINGS:  Radiopaque packing material projects around the first  digit. Transverse fracture through the distal first phalanx with  suspected comminuted fracture line extending towards the anterolateral  interphalangeal joint space. Soft tissue irregularity about the distal  first phalanx.     Advanced first carpometacarpal joint and triscaphe joint degenerative  changes. The joint spaces are otherwise preserved. No foreign body is  seen.       Impression    IMPRESSION: Acute comminuted fracture of the distal first phalanx with  fracture line likely extending to the joint line.      ARA SCHROEDER MD         SYSTEM ID:  [id number]   CT Femur Thigh Left w/o Contrast    Narrative    EXAMINATION: CT FEMUR THIGH LEFT W/O CONTRAST, 8/17/2022 2:47 PM    COMPARISON: None    HISTORY: posterior left upper leg injury    Technique:  Imaging protocol: Multiplanar CT images of the left femur without  contrast.   Acquisition: This CT exam was performed using one or more the  following dose reduction techniques: automated exposure control,  adjustment of the mA and/or kV according to patient size, and/or  iterative reconstruction technique.    FINDINGS:    SOFT TISSUES:    Heterogeneous density in the posterior compartment of the thigh,  consistent with intramuscular  hematoma. Prominent serpiginous course  of the proximal myotendinous semimembranosus. Mild prepatellar soft  tissue edema. No large effusion of the knee or left hip.    BONES:  No suspicious osseous lesions. Fibro-osseous lesion in the distal  femur, favored enchondroma. Degenerative periarticular changes.    VISUALIZED PELVIS:  Limited evaluation of the pelvis demonstrates no acute parenchymal  abnormalities, nonobstructive bowel gas pattern, and no free fluid or  free air. Small fat containing left inguinal hernia. No  lymphadenopathy.       Impression    IMPRESSION:  Left posterior compartment intramuscular hematoma, likely associated  with hamstring tear.    ARA SCHROEDER MD         SYSTEM ID:  [id number]   CBC with platelets differential    Narrative    The following orders were created for panel order CBC with platelets differential.  Procedure                               Abnormality         Status                     ---------                               -----------         ------                     CBC with platelets and d...[[id number]]  Abnormal            Final result                 Please view results for these tests on the individual orders.   CBC with platelets and differential   Result Value Ref Range    WBC Count 8.7 4.0 - 11.0 10e3/uL    RBC Count 4.46 4.40 - 5.90 10e6/uL    Hemoglobin 13.7 13.3 - 17.7 g/dL    Hematocrit 39.0 (L) 40.0 - 53.0 %    MCV 87 78 - 100 fL    MCH 30.7 26.5 - 33.0 pg    MCHC 35.1 31.5 - 36.5 g/dL    RDW 13.2 10.0 - 15.0 %    Platelet Count 156 150 - 450 10e3/uL    % Neutrophils 78 %    % Lymphocytes 11 %    % Monocytes 9 %    % Eosinophils 2 %    % Basophils 0 %    % Immature Granulocytes 0 %    NRBCs per 100 WBC 0 <1 /100    Absolute Neutrophils 6.8 1.6 - 8.3 10e3/uL    Absolute Lymphocytes 0.9 0.8 - 5.3 10e3/uL    Absolute Monocytes 0.8 0.0 - 1.3 10e3/uL    Absolute Eosinophils 0.2 0.0 - 0.7 10e3/uL    Absolute Basophils 0.0 0.0 - 0.2 10e3/uL    Absolute Immature  Granulocytes 0.0 <=0.4 10e3/uL    Absolute NRBCs 0.0 10e3/uL   Extra Tube    Narrative    The following orders were created for panel order Extra Tube.  Procedure                               Abnormality         Status                     ---------                               -----------         ------                     Extra Blue Top Tube[[id number]]                              In process                 Extra Red Top Tube[[id number]]                               In process                 Extra Green Top (Lithium...[[id number]]                      In process                   Please view results for these tests on the individual orders.       Medications   fentaNYL (PF) (SUBLIMAZE) injection 50 mcg (50 mcg Intravenous Given 8/17/22 1147)   fentaNYL (PF) (SUBLIMAZE) injection 50 mcg (50 mcg Intravenous Given 8/17/22 1247)   Tdap (tetanus-diphtheria-acell pertussis) (ADACEL) injection 0.5 mL (0.5 mLs Intramuscular Given 8/17/22 1247)   ceFAZolin (ANCEF) intermittent infusion 1 g (0 g Intravenous Stopped 8/17/22 1343)   ketorolac (TORADOL) injection 15 mg (15 mg Intravenous Given 8/17/22 1550)   fentaNYL (PF) (SUBLIMAZE) injection 50 mcg (50 mcg Intravenous Given 8/17/22 1546)   cyclobenzaprine (FLEXERIL) tablet 10 mg (10 mg Oral Given 8/17/22 1634)       Assessments & Plan (with Medical Decision Making)     I have reviewed the nursing notes.    I have reviewed the findings, diagnosis, plan and need for follow up with the patient.  Plan is to discharge the patient with appropriate prescriptions, discharge and follow-up instructions.    New Prescriptions    CEPHALEXIN (KEFLEX) 500 MG CAPSULE    Take 1 capsule (500 mg) by mouth 4 times daily for 7 days    CYCLOBENZAPRINE (FLEXERIL) 10 MG TABLET    Take 1 tablet (10 mg) by mouth 3 times daily as needed for muscle spasms    HYDROCODONE-ACETAMINOPHEN (NORCO) 5-325 MG TABLET    Take 1 tablet by mouth every 6 hours as needed for pain       Final diagnoses:   Open  fracture of base of distal phalanx of left thumb   Strain of left hamstring muscle, initial encounter       8/17/2022   [facility]     Gómez Tomas,   08/18/22 0711

## 2022-08-18 ENCOUNTER — APPOINTMENT (OUTPATIENT)
Dept: PHYSICAL THERAPY | Facility: HOSPITAL | Age: 67
End: 2022-08-18
Attending: INTERNAL MEDICINE
Payer: COMMERCIAL

## 2022-08-18 PROBLEM — K52.832 LYMPHOCYTIC COLITIS: Chronic | Status: ACTIVE | Noted: 2022-08-18
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
## 2022-08-18 PROBLEM — N18.31 STAGE 3A CHRONIC KIDNEY DISEASE (H): Status: ACTIVE | Noted: 2022-08-18

## 2022-08-18 LAB
ABO/RH(D): NORMAL
ALBUMIN SERPL-MCNC: 3.2 G/DL (ref 3.4–5)
ALBUMIN UR-MCNC: NEGATIVE MG/DL
ALP SERPL-CCNC: 70 U/L (ref 40–150)
ALT SERPL W P-5'-P-CCNC: 24 U/L (ref 0–70)
ANION GAP SERPL CALCULATED.3IONS-SCNC: 4 MMOL/L (ref 3–14)
ANTIBODY SCREEN: NEGATIVE
APPEARANCE UR: CLEAR
AST SERPL W P-5'-P-CCNC: 19 U/L (ref 0–45)
BACTERIA #/AREA URNS HPF: ABNORMAL /HPF
BILIRUB DIRECT SERPL-MCNC: 0.1 MG/DL (ref 0–0.2)
BILIRUB SERPL-MCNC: 0.4 MG/DL (ref 0.2–1.3)
BILIRUB UR QL STRIP: NEGATIVE
BUN SERPL-MCNC: 17 MG/DL (ref 7–30)
CALCIUM SERPL-MCNC: 8.1 MG/DL (ref 8.5–10.1)
CHLORIDE BLD-SCNC: 110 MMOL/L (ref 94–109)
CO2 SERPL-SCNC: 25 MMOL/L (ref 20–32)
COLOR UR AUTO: ABNORMAL
CREAT SERPL-MCNC: 1.33 MG/DL (ref 0.66–1.25)
ERYTHROCYTE [DISTWIDTH] IN BLOOD BY AUTOMATED COUNT: 13.2 % (ref 10–15)
GFR SERPL CREATININE-BSD FRML MDRD: 59 ML/MIN/1.73M2
GLUCOSE BLD-MCNC: 100 MG/DL (ref 70–99)
GLUCOSE UR STRIP-MCNC: NEGATIVE MG/DL
HCT VFR BLD AUTO: 37.1 % (ref 40–53)
HGB BLD-MCNC: 13 G/DL (ref 13.3–17.7)
HGB UR QL STRIP: NEGATIVE
HYALINE CASTS: 1 /LPF
KETONES UR STRIP-MCNC: NEGATIVE MG/DL
LEUKOCYTE ESTERASE UR QL STRIP: NEGATIVE
MCH RBC QN AUTO: 30.7 PG (ref 26.5–33)
MCHC RBC AUTO-ENTMCNC: 35 G/DL (ref 31.5–36.5)
MCV RBC AUTO: 88 FL (ref 78–100)
MUCOUS THREADS #/AREA URNS LPF: PRESENT /LPF
NITRATE UR QL: NEGATIVE
PH UR STRIP: 6 [PH] (ref 4.7–8)
PHOSPHATE SERPL-MCNC: 3.3 MG/DL (ref 2.5–4.5)
PLATELET # BLD AUTO: 165 10E3/UL (ref 150–450)
POTASSIUM BLD-SCNC: 3.9 MMOL/L (ref 3.4–5.3)
PROT SERPL-MCNC: 5.9 G/DL (ref 6.8–8.8)
RBC # BLD AUTO: 4.23 10E6/UL (ref 4.4–5.9)
RBC URINE: 0 /HPF
SODIUM SERPL-SCNC: 139 MMOL/L (ref 133–144)
SP GR UR STRIP: 1.01 (ref 1–1.03)
SPECIMEN EXPIRATION DATE: NORMAL
SQUAMOUS EPITHELIAL: 0 /HPF
TROPONIN I SERPL HS-MCNC: 40 NG/L
UROBILINOGEN UR STRIP-MCNC: NORMAL MG/DL
WBC # BLD AUTO: 7 10E3/UL (ref 4–11)
WBC URINE: <1 /HPF

## 2022-08-18 PROCEDURE — 86850 RBC ANTIBODY SCREEN: CPT | Performed by: INTERNAL MEDICINE

## 2022-08-18 PROCEDURE — 96376 TX/PRO/DX INJ SAME DRUG ADON: CPT

## 2022-08-18 PROCEDURE — 36415 COLL VENOUS BLD VENIPUNCTURE: CPT | Performed by: INTERNAL MEDICINE

## 2022-08-18 PROCEDURE — 999N000209 HC STATISTIC OT OUTPT VISIT

## 2022-08-18 PROCEDURE — 84100 ASSAY OF PHOSPHORUS: CPT | Performed by: INTERNAL MEDICINE

## 2022-08-18 PROCEDURE — 99220 PR INITIAL OBSERVATION CARE,LEVEL III: CPT | Mod: AI | Performed by: INTERNAL MEDICINE

## 2022-08-18 PROCEDURE — 250N000013 HC RX MED GY IP 250 OP 250 PS 637: Performed by: INTERNAL MEDICINE

## 2022-08-18 PROCEDURE — 250N000011 HC RX IP 250 OP 636: Performed by: INTERNAL MEDICINE

## 2022-08-18 PROCEDURE — G0378 HOSPITAL OBSERVATION PER HR: HCPCS

## 2022-08-18 PROCEDURE — 82248 BILIRUBIN DIRECT: CPT | Performed by: INTERNAL MEDICINE

## 2022-08-18 PROCEDURE — 97161 PT EVAL LOW COMPLEX 20 MIN: CPT | Mod: GP | Performed by: PHYSICAL THERAPIST

## 2022-08-18 PROCEDURE — 97530 THERAPEUTIC ACTIVITIES: CPT | Mod: GP | Performed by: PHYSICAL THERAPIST

## 2022-08-18 PROCEDURE — 81003 URINALYSIS AUTO W/O SCOPE: CPT | Performed by: INTERNAL MEDICINE

## 2022-08-18 PROCEDURE — 85027 COMPLETE CBC AUTOMATED: CPT | Performed by: INTERNAL MEDICINE

## 2022-08-18 PROCEDURE — 250N000011 HC RX IP 250 OP 636: Performed by: EMERGENCY MEDICINE

## 2022-08-18 PROCEDURE — 84484 ASSAY OF TROPONIN QUANT: CPT | Performed by: INTERNAL MEDICINE

## 2022-08-18 PROCEDURE — 93010 ELECTROCARDIOGRAM REPORT: CPT | Performed by: INTERNAL MEDICINE

## 2022-08-18 PROCEDURE — 86901 BLOOD TYPING SEROLOGIC RH(D): CPT | Performed by: INTERNAL MEDICINE

## 2022-08-18 PROCEDURE — 96366 THER/PROPH/DIAG IV INF ADDON: CPT

## 2022-08-18 RX ORDER — CEFAZOLIN SODIUM 1 G/50ML
1 INJECTION, SOLUTION INTRAVENOUS EVERY 8 HOURS
Status: DISCONTINUED | OUTPATIENT
Start: 2022-08-18 | End: 2022-08-19 | Stop reason: HOSPADM

## 2022-08-18 RX ORDER — LOSARTAN POTASSIUM 50 MG/1
100 TABLET ORAL DAILY
Status: DISCONTINUED | OUTPATIENT
Start: 2022-08-18 | End: 2022-08-19 | Stop reason: HOSPADM

## 2022-08-18 RX ORDER — NALOXONE HYDROCHLORIDE 0.4 MG/ML
0.2 INJECTION, SOLUTION INTRAMUSCULAR; INTRAVENOUS; SUBCUTANEOUS
Status: DISCONTINUED | OUTPATIENT
Start: 2022-08-18 | End: 2022-08-19 | Stop reason: HOSPADM

## 2022-08-18 RX ORDER — NALOXONE HYDROCHLORIDE 0.4 MG/ML
0.4 INJECTION, SOLUTION INTRAMUSCULAR; INTRAVENOUS; SUBCUTANEOUS
Status: DISCONTINUED | OUTPATIENT
Start: 2022-08-18 | End: 2022-08-19 | Stop reason: HOSPADM

## 2022-08-18 RX ORDER — TRIAMCINOLONE ACETONIDE 1 MG/G
CREAM TOPICAL 2 TIMES DAILY PRN
COMMUNITY

## 2022-08-18 RX ORDER — KETOROLAC TROMETHAMINE 15 MG/ML
15 INJECTION, SOLUTION INTRAMUSCULAR; INTRAVENOUS ONCE
Status: DISCONTINUED | OUTPATIENT
Start: 2022-08-18 | End: 2022-08-18 | Stop reason: CLARIF

## 2022-08-18 RX ORDER — ACETAMINOPHEN 325 MG/1
650 TABLET ORAL EVERY 6 HOURS PRN
Status: DISCONTINUED | OUTPATIENT
Start: 2022-08-18 | End: 2022-08-19 | Stop reason: HOSPADM

## 2022-08-18 RX ORDER — ASPIRIN 81 MG/1
81 TABLET ORAL DAILY
COMMUNITY

## 2022-08-18 RX ORDER — ACETAMINOPHEN 650 MG/1
650 SUPPOSITORY RECTAL EVERY 6 HOURS PRN
Status: DISCONTINUED | OUTPATIENT
Start: 2022-08-18 | End: 2022-08-19 | Stop reason: HOSPADM

## 2022-08-18 RX ORDER — KETOROLAC TROMETHAMINE 15 MG/ML
15 INJECTION, SOLUTION INTRAMUSCULAR; INTRAVENOUS EVERY 6 HOURS PRN
Status: DISCONTINUED | OUTPATIENT
Start: 2022-08-18 | End: 2022-08-19 | Stop reason: HOSPADM

## 2022-08-18 RX ORDER — AMLODIPINE BESYLATE 5 MG/1
5 TABLET ORAL DAILY
Status: DISCONTINUED | OUTPATIENT
Start: 2022-08-18 | End: 2022-08-19 | Stop reason: HOSPADM

## 2022-08-18 RX ORDER — OXYCODONE AND ACETAMINOPHEN 5; 325 MG/1; MG/1
1-2 TABLET ORAL EVERY 4 HOURS PRN
Status: DISCONTINUED | OUTPATIENT
Start: 2022-08-18 | End: 2022-08-18

## 2022-08-18 RX ORDER — SUMATRIPTAN 50 MG/1
50 TABLET, FILM COATED ORAL
Status: DISCONTINUED | OUTPATIENT
Start: 2022-08-18 | End: 2022-08-19 | Stop reason: HOSPADM

## 2022-08-18 RX ORDER — KETOROLAC TROMETHAMINE 15 MG/ML
15 INJECTION, SOLUTION INTRAMUSCULAR; INTRAVENOUS ONCE
Status: COMPLETED | OUTPATIENT
Start: 2022-08-18 | End: 2022-08-18

## 2022-08-18 RX ORDER — ONDANSETRON 2 MG/ML
4 INJECTION INTRAMUSCULAR; INTRAVENOUS EVERY 6 HOURS PRN
Status: DISCONTINUED | OUTPATIENT
Start: 2022-08-18 | End: 2022-08-19 | Stop reason: HOSPADM

## 2022-08-18 RX ORDER — HYDROMORPHONE HYDROCHLORIDE 1 MG/ML
0.5 INJECTION, SOLUTION INTRAMUSCULAR; INTRAVENOUS; SUBCUTANEOUS EVERY 4 HOURS PRN
Status: DISCONTINUED | OUTPATIENT
Start: 2022-08-18 | End: 2022-08-19 | Stop reason: HOSPADM

## 2022-08-18 RX ORDER — ONDANSETRON 4 MG/1
4 TABLET, ORALLY DISINTEGRATING ORAL EVERY 6 HOURS PRN
Status: DISCONTINUED | OUTPATIENT
Start: 2022-08-18 | End: 2022-08-19 | Stop reason: HOSPADM

## 2022-08-18 RX ORDER — LOSARTAN POTASSIUM 100 MG/1
100 TABLET ORAL DAILY
COMMUNITY
Start: 2022-08-15

## 2022-08-18 RX ORDER — OXYCODONE HYDROCHLORIDE 5 MG/1
10 TABLET ORAL EVERY 4 HOURS PRN
Status: DISCONTINUED | OUTPATIENT
Start: 2022-08-18 | End: 2022-08-19 | Stop reason: HOSPADM

## 2022-08-18 RX ORDER — KETOROLAC TROMETHAMINE 15 MG/ML
15 INJECTION, SOLUTION INTRAMUSCULAR; INTRAVENOUS EVERY 6 HOURS PRN
Status: DISCONTINUED | OUTPATIENT
Start: 2022-08-18 | End: 2022-08-18

## 2022-08-18 RX ORDER — ASPIRIN 81 MG/1
81 TABLET ORAL DAILY
Status: DISCONTINUED | OUTPATIENT
Start: 2022-08-18 | End: 2022-08-19 | Stop reason: HOSPADM

## 2022-08-18 RX ADMIN — KETOROLAC TROMETHAMINE 15 MG: 15 INJECTION, SOLUTION INTRAMUSCULAR; INTRAVENOUS at 11:03

## 2022-08-18 RX ADMIN — HYDROMORPHONE HYDROCHLORIDE 1 MG: 1 INJECTION, SOLUTION INTRAMUSCULAR; INTRAVENOUS; SUBCUTANEOUS at 04:38

## 2022-08-18 RX ADMIN — HYDROMORPHONE HYDROCHLORIDE 0.5 MG: 1 INJECTION, SOLUTION INTRAMUSCULAR; INTRAVENOUS; SUBCUTANEOUS at 22:00

## 2022-08-18 RX ADMIN — OXYCODONE HYDROCHLORIDE AND ACETAMINOPHEN 2 TABLET: 5; 325 TABLET ORAL at 10:19

## 2022-08-18 RX ADMIN — CEFAZOLIN SODIUM 1 G: 1 INJECTION, SOLUTION INTRAVENOUS at 14:11

## 2022-08-18 RX ADMIN — ACETAMINOPHEN 325MG 650 MG: 325 TABLET ORAL at 12:46

## 2022-08-18 RX ADMIN — OXYCODONE HYDROCHLORIDE 10 MG: 5 TABLET ORAL at 17:33

## 2022-08-18 RX ADMIN — ASPIRIN 81 MG: 81 TABLET, COATED ORAL at 13:34

## 2022-08-18 RX ADMIN — OXYCODONE HYDROCHLORIDE AND ACETAMINOPHEN 2 TABLET: 5; 325 TABLET ORAL at 06:21

## 2022-08-18 RX ADMIN — OXYCODONE HYDROCHLORIDE 10 MG: 5 TABLET ORAL at 21:27

## 2022-08-18 RX ADMIN — HYDROMORPHONE HYDROCHLORIDE 1 MG: 1 INJECTION, SOLUTION INTRAMUSCULAR; INTRAVENOUS; SUBCUTANEOUS at 01:17

## 2022-08-18 RX ADMIN — LOSARTAN POTASSIUM 100 MG: 50 TABLET, FILM COATED ORAL at 13:33

## 2022-08-18 RX ADMIN — ACETAMINOPHEN 325MG 650 MG: 325 TABLET ORAL at 21:27

## 2022-08-18 RX ADMIN — OXYCODONE HYDROCHLORIDE 10 MG: 5 TABLET ORAL at 13:39

## 2022-08-18 RX ADMIN — KETOROLAC TROMETHAMINE 15 MG: 15 INJECTION, SOLUTION INTRAMUSCULAR; INTRAVENOUS at 21:52

## 2022-08-18 RX ADMIN — CEFAZOLIN SODIUM 1 G: 1 INJECTION, SOLUTION INTRAVENOUS at 06:03

## 2022-08-18 RX ADMIN — AMLODIPINE BESYLATE 5 MG: 5 TABLET ORAL at 12:46

## 2022-08-18 RX ADMIN — SUMATRIPTAN SUCCINATE 50 MG: 50 TABLET ORAL at 21:58

## 2022-08-18 RX ADMIN — CEFAZOLIN SODIUM 1 G: 1 INJECTION, SOLUTION INTRAVENOUS at 21:38

## 2022-08-18 ASSESSMENT — ACTIVITIES OF DAILY LIVING (ADL)
ADLS_ACUITY_SCORE: 22
ADLS_ACUITY_SCORE: 22
ADLS_ACUITY_SCORE: 37
ADLS_ACUITY_SCORE: 37
ADLS_ACUITY_SCORE: 22
ADLS_ACUITY_SCORE: 37
ADLS_ACUITY_SCORE: 20
ADLS_ACUITY_SCORE: 22
ADLS_ACUITY_SCORE: 37
ADLS_ACUITY_SCORE: 22

## 2022-08-18 NOTE — PROGRESS NOTES
Care Transitions focused note:      Discussed with Ned the various options for transportation to his appointment.  Unable to get speciality transportation d/t the variables with his appointment in [place] tomorrow.  Ned understands this and will plan to take a private vehicle. PUSHPA Turcios @[phone] August 18, 2022

## 2022-08-18 NOTE — H&P
[facility]    History and Physical - Hospitalist Service       Date of Admission:  8/17/2022    Assessment & Plan      Ned Pimentel is a 67 year old male admitted on 8/17/2022. He presents with left thumb fracture and left thigh hematoma after a fall episode.    #1 status post fall at home  This appears to be purely mechanical fall.  It is possible that patient's chronic diarrhea might of contributed to mild dehydration and peripheral neuropathy in the feet leading to loss of coordination at the fall, but these appear to be minor contribution.  For possibility of syncopal episode however, will monitor patient on telemetry for 24 hours.  We will also check cardiac enzyme as well.    2.  Left thumb fracture  Patient's laceration is pretty significant cross-sectionally, which might have severed significant amount of vasculature.  Will request surgical consult to evaluate.  For fracture, patient will follow-up with orthopedic surgery as an outpatient.    3.  Left thigh hematoma  We will continue with pain control.  As patient is unable to ambulate, will get functional evaluation from physical therapy.    4.  Lymphocytic colitis  While patient is still having 3-5 times bowel movements a day, this has been under control as patient has been able to maintain his weight.    5.  Peripheral neuropathy  Patient reports that herbal supplementation called Neuropure, has been helpful in improving symptoms.    6.  Chronic kidney disease, stage IIIa  Patient has had elevated creatinine since 2 years ago, GFR in the 40s.  Today his GFR is 59, improvement from his baseline.       Diet: Regular Diet Adult    DVT Prophylaxis: Pneumatic Compression Devices  Booth Catheter: Not present  Central Lines: None  Cardiac Monitoring: None  Code Status:  Full    Clinically Significant Risk Factors Present on Admission             # Hypoalbuminemia: Albumin = 3.2 g/dL (Ref range: 3.4 - 5.0 g/dL) on admission, will monitor as  "appropriate    # Platelet Defect: home medication list includes an antiplatelet medication  # Hypertension: home medication list includes antihypertensive(s)    # Obesity: Estimated body mass index is 30.68 kg/m  as calculated from the following:    Height as of this encounter: 1.803 m (5' 11\").    Weight as of this encounter: 99.8 kg (220 lb).        Disposition Plan    Expect 24-hour observation    The patient's care was discussed with the Bedside Nurse and Patient.    Paul Holm MD  Hospitalist Service  [facility]  Securely message with the Vocera Web Console (learn more here)  Text page via [facility] Paging/Directory         ______________________________________________________________________    Chief Complaint   Fall at home with left thumb trauma    History is obtained from the patient    History of Present Illness   Ned Pimentel is a 67 year old male with history of peripheral neuropathy, chronic diarrhea with lymphocytic colitis, essential hypertension, peptic ulcer disease, gout.  Patient reports that having had much weight loss with the chronic diarrhea in the beginning of this year.  Colonoscopy with a biopsy revealed lymphocytic colitis and he was treated with a course of steroid.  Patient is still having frequent bowel movements 3-5 times a day but the condition did improve and he has not lost weight since the steroid treatment.  On the day of admission, patient was running away from hornets at home.  He slipped on a wet grass and fell sustaining laceration in his left thumb.  Immediately, patient developed left thigh pain in the back, and felt like he pulled a muscle.  Patient denies any loss of consciousness, preceding chest pain, palpitation, lightheadedness.  Patient also denies any fever, chills, cough, nausea, vomiting, dysuria, peripheral edema, arthralgia.  He was brought to the emergency room by EMS and obvious deformity in his thumb was noted.  X-ray revealed acute comminuted " fracture of the distal first phalanx extending the joint line.  The case was discussed with Dr. Garcia, orthopedic surgery, who recommended splinting, pain control and oral antibiotics with close outpatient follow-up.  For left thigh pain, patient was evaluated with a CT scan which showed left posterior compartment intramuscular hematoma, likely associated with hamstring tear.  Patient was about to be discharged from the ER, but was noted that he was unable to ambulate due to pain.  Patient is now being admitted to the hospital for further care.      Review of Systems    The 10 point Review of Systems is negative other than noted in the HPI or here.      Past Medical History    I have reviewed this patient's medical history and updated it with pertinent information if needed.   Past Medical History:   Diagnosis Date     ED (erectile dysfunction)      Gout      Hypertension      Neuropathy     numbness in both feet     Upper GI bleed     bleeding peptic ulcer, hemoglobin 6, transfused       Past Surgical History   I have reviewed this patient's surgical history and updated it with pertinent information if needed.  Past Surgical History:   Procedure Laterality Date     ARTHRODESIS ANKLE Right 9/18/2017    Procedure: ARTHRODESIS ANKLE;  REVISION TALONAVICULAR FUSION AND CALCANEALCUBOID FUSION (C-ARM,H PLATES, INFUSE);  Surgeon: Mary Jaeger MD;  [facility]: [facility]     arthroscopic knee surgery       HERNIA REPAIR      umbilical     ORTHOPEDIC SURGERY Right     foot surgery     SHOULDER SURGERY       SINUS SURGERY         Social History   I have reviewed this patient's social history and updated it with pertinent information if needed.  Social History     Tobacco Use     Smoking status: Never Smoker     Smokeless tobacco: Never Used   Substance Use Topics     Alcohol use: No     Drug use: No       Family History   I have reviewed this patient's family history and updated it with pertinent information if  needed.  Family History   Problem Relation Age of Onset     Heart Disease Father         2 heart attacks by age 61     Heart Disease Brother         heart attack age 50       Prior to Admission Medications   Prior to Admission Medications   Prescriptions Last Dose Informant Patient Reported? Taking?   AMLODIPINE BESYLATE PO 8/16/2022 at Unknown time  Yes Yes   Sig: Take 5 mg by mouth daily    ASPIRIN PO 8/16/2022 at Unknown time  Yes Yes   Sig: Take 81 mg by mouth daily   SILDENAFIL CITRATE PO   Yes No   Sig: Take 20 mg by mouth   SUMAtriptan Succinate (IMITREX PO)   Yes No   Sig: Take 50 mg by mouth Take at onset of migraine. May repeat in 2 hours if needed.   Tadalafil (CIALIS PO)   Yes No   Sig: Take 20 mg by mouth   hydrOXYzine (ATARAX) 25 MG tablet   No No   Sig: Take 1 tablet (25 mg) by mouth every 6 hours as needed for itching (and nausea)   losartan-hydrochlorothiazide (HYZAAR) 100-12.5 MG per tablet 8/16/2022 at Unknown time  Yes Yes   Sig: Take 1 tablet by mouth daily   ondansetron (ZOFRAN-ODT) 4 MG ODT tab   No No   Sig: Take 1-2 tablets (4-8 mg) by mouth every 8 hours as needed for nausea Dissolve ON the tongue.   oxyCODONE-acetaminophen (PERCOCET) 5-325 MG per tablet   No No   Sig: Take 1-2 tablets by mouth every 4 hours as needed for pain (moderate to severe)   senna-docusate (SENOKOT-S;PERICOLACE) 8.6-50 MG per tablet   No No   Sig: Take 1-2 tablets by mouth 2 times daily Take while on oral narcotics to prevent or treat constipation.      Facility-Administered Medications: None     Allergies   No Known Allergies    Physical Exam   Vital Signs: Temp: 99  F (37.2  C) Temp src: Oral BP: 151/85 Pulse: 75   Resp: 18 SpO2: 94 % O2 Device: None (Room air)    Weight: 220 lbs 0 oz    General Appearance: Lying in bed in no acute distress, pleasant, appropriate  Eyes: Bilateral cataract surgeries were noted  HEENT: Clear oropharynx, neck was supple, no JVD  Respiratory: Clear to auscultation  bilaterally  Cardiovascular: S1-S2, regular rhythm and rate, no murmurs rubs or gallops  GI: Obese, soft, nontender, nondistended, bowel sound present in all 4 quadrants  Lymph/Hematologic: No palpable lymph nodes, no purpura or obvious ecchymosis noted  Genitourinary: Deferred  Skin: Intact  Musculoskeletal: Mild bulge at the posterior left thigh that is tender.  No obvious ecchymosis noted  Neurologic: Cranial nerve exam showing no facial asymmetry, motor examination showed adequate strength throughout, sensory examination showed intact light touch sensation throughout the body except at the soles of feet bilaterally where decreased sensation was noted.  Psychiatric: Appropriate affect    Data   Data reviewed today: I reviewed all medications, new labs and imaging results over the last 24 hours. I personally reviewed the EKG tracing showing Flat T wave in lead III, otherwise unremarkable and the X-ray of the left thumb showing comminuted fracture image(s) showing CT scan of the left thigh showed hematoma.    Recent Labs   Lab 08/18/22  0344 08/17/22  1754   WBC 7.0 8.7   HGB 13.0* 13.7   MCV 88 87    156    139   POTASSIUM 3.9 3.9   CHLORIDE 110* 109   CO2 25 24   BUN 17 15   CR 1.33* 1.32*   ANIONGAP 4 6   AGUS 8.1* 8.1*   * 104*   ALBUMIN 3.2* 3.4   PROTTOTAL 5.9* 6.1*   BILITOTAL 0.4 0.6   ALKPHOS 70 71   ALT 24 24   AST 19 22     Recent Results (from the past 24 hour(s))   XR Finger Left 2 Views    Narrative    PROCEDURE:  XR FINGER LEFT G/E 2 VIEWS    HISTORY: trauma    COMPARISON:  None.    TECHNIQUE:  3 view left finger.    FINDINGS:  Radiopaque packing material projects around the first  digit. Transverse fracture through the distal first phalanx with  suspected comminuted fracture line extending towards the anterolateral  interphalangeal joint space. Soft tissue irregularity about the distal  first phalanx.     Advanced first carpometacarpal joint and triscaphe joint  degenerative  changes. The joint spaces are otherwise preserved. No foreign body is  seen.       Impression    IMPRESSION: Acute comminuted fracture of the distal first phalanx with  fracture line likely extending to the joint line.      ARA SCHROEDER MD         SYSTEM ID:  [id number]   CT Femur Thigh Left w/o Contrast    Narrative    EXAMINATION: CT FEMUR THIGH LEFT W/O CONTRAST, 8/17/2022 2:47 PM    COMPARISON: None    HISTORY: posterior left upper leg injury    Technique:  Imaging protocol: Multiplanar CT images of the left femur without  contrast.   Acquisition: This CT exam was performed using one or more the  following dose reduction techniques: automated exposure control,  adjustment of the mA and/or kV according to patient size, and/or  iterative reconstruction technique.    FINDINGS:    SOFT TISSUES:    Heterogeneous density in the posterior compartment of the thigh,  consistent with intramuscular hematoma. Prominent serpiginous course  of the proximal myotendinous semimembranosus. Mild prepatellar soft  tissue edema. No large effusion of the knee or left hip.    BONES:  No suspicious osseous lesions. Fibro-osseous lesion in the distal  femur, favored enchondroma. Degenerative periarticular changes.    VISUALIZED PELVIS:  Limited evaluation of the pelvis demonstrates no acute parenchymal  abnormalities, nonobstructive bowel gas pattern, and no free fluid or  free air. Small fat containing left inguinal hernia. No  lymphadenopathy.       Impression    IMPRESSION:  Left posterior compartment intramuscular hematoma, likely associated  with hamstring tear.    ARA SCHROEDER MD         SYSTEM ID:  [id number]

## 2022-08-18 NOTE — PROGRESS NOTES
Patient seen on 3 center.  Still has a fair amount of discomfort on the left hamstring.  Has been getting some Percocet which she does not feel is adequate.  We will give him some Toradol along with some oxycodone and just as needed IV Dilaudid for breakthrough pain.  He is hemodynamically stable.  Afebrile.  He is getting some IV Ancef while here.    I did speak with Dr. Martin's nurse/ over at the [facility] plastic surgery office and the [facility].  They will see the patient there tomorrow at 11:15 AM.  We will need to send our notes also get the x-rays on a disc of his hand.    The plan is to discharge him tomorrow hopefully he will be able to go by private vehicle.  Otherwise he may have to have a van.  He is unable to sit for for prolonged periods.

## 2022-08-18 NOTE — PROGRESS NOTES
08/18/22 1400   Appointment Canceled   Appointment Canceled With other staff/provider;Pain   Cancel Comments Attempted to see patient this PM for OT eval. Pt was with nursing working on getting pain meds. When OT returned later, CM was in to discuss transportation options for tomorrow. OT will attempt again when able.   Signing Clinician's Name / Credentials   Signing clinician's name / credentials Heavenly Montes OTR/L   Quick Adds   Rehab Discipline OT

## 2022-08-18 NOTE — PROVIDER NOTIFICATION
Patient requesting to take losartan and aspirin while here but meds were not ordered. MD notified. Orders received for losartan and aspirin.    In addition, patient rating pain 7/10. Messaged MD to see if alternative medications can be ordered to manage patient's pain as Tylenol and Percocet are the only pain meds currently ordered. Orders received for dilaudid, oxycodone, toradol, and naloxone. Percocet discontinued by provider.

## 2022-08-18 NOTE — PROGRESS NOTES
08/18/22 0800   Appointment Canceled   Appointment Canceled Other (see Cancel Comments row)   Cancel Comments Attempted to see patient for OT evaluation this morning. Patient had recently worked with PT. He reports his greatest barrier to DC at this time is sitting and concern of being able to ride home in a vehicle. Patient was educated in the role of OT and he discussed wanting to ice to see if this helps his pain. Furthermore, he just attempted some ADLs (commode/toilet transfer) and he was unable to due to pain. OT will let patient ice and return later as able.   Signing Clinician's Name / Credentials   Signing clinician's name / credentials BRADLEY Arvizu/l   Quick Adds   Rehab Discipline OT

## 2022-08-18 NOTE — CARE PLAN
Toradol given for pain per orders as pt is rating his pain to lt hand and lt thigh a 8/10.  Pt taken to Med/Surg via bed, accompanied by myself and his spouse.  Pt ambulated from the bed in the [facility] to the scale in his room then to the bed, using one crutch.  Mandy GREGG present in the room upon transfer.

## 2022-08-18 NOTE — PROGRESS NOTES
08/18/22 0928   Quick Adds   Type of Visit Initial PT Evaluation   Living Environment   People in Home spouse   Current Living Arrangements house   Home Accessibility stairs to enter home;stairs within home   Number of Stairs, Main Entrance 1   Stair Railings, Main Entrance none   Number of Stairs, Within Home, Primary six   Stair Railings, Within Home, Primary railing on left side (ascending)   Transportation Anticipated car, drives self;family or friend will provide   Living Environment Comments Pt lives in a split level home.  Has a walk in basement so could enter through that and manage on lower floor if needed   Self-Care   Usual Activity Tolerance good   Current Activity Tolerance fair   Equipment Currently Used at Home none   Fall history within last six months yes   Number of times patient has fallen within last six months 1   Activity/Exercise/Self-Care Comment Pt was completely independent, working, and driving prior to admission.   General Information   Onset of Illness/Injury or Date of Surgery 08/17/22   Referring Physician Dr. Holm   Patient/Family Therapy Goals Statement (PT) to get his pain better   Pertinent History of Current Problem (include personal factors and/or comorbidities that impact the POC) Pt was running from [employer] and slipped in his yard resulting in left thumb fracture and left HS tear with hematoma.   Cognition   Affect/Mental Status (Cognition) WNL   Orientation Status (Cognition) oriented x 4   Follows Commands (Cognition) over 90% accuracy   Pain Assessment   Patient Currently in Pain Yes, see Vital Sign flowsheet  (6/10 in left posterior thigh and buttocks)   Integumentary/Edema   Integumentary/Edema Comments left thumb wrapped with ace bandage.  No notable bruising in left hip/buttocks region   Range of Motion (ROM)   ROM Comment WNL with pain   Strength (Manual Muscle Testing)   Strength Comments MMT deferred due to pain but appears WFL   Bed Mobility   Bed Mobility no  deficits identified   Comment, (Bed Mobility) poor tolerance for sitting so immediately transfers from supine to standing   Transfers   Transfers sit-stand transfer   Sit-Stand Transfer   Sit-Stand Addis (Transfers) supervision   Assistive Device (Sit-Stand Transfers) crutches, axillary  (single on right)   Gait/Stairs (Locomotion)   Addis Level (Gait) supervision   Assistive Device (Gait) crutches, axillary  (single on right)   Distance in Feet (Required for LE Total Joints) 50'   Pattern (Gait) step-to   Deviations/Abnormal Patterns (Gait) antalgic   Comment, (Gait/Stairs) Pt reports it feels better to be up and walking than sitting   Balance   Balance Comments good with support from crutch   Clinical Impression   Criteria for Skilled Therapeutic Intervention Yes, treatment indicated   PT Diagnosis (PT) gait disturbance   Influenced by the following impairments pain   Functional limitations due to impairments decreased tolerance for sitting due to pain   Clinical Presentation (PT Evaluation Complexity) Stable/Uncomplicated   Clinical Presentation Rationale   (clinical judgement)   Clinical Decision Making (Complexity) low complexity   Planned Therapy Interventions (PT) patient/family education;home program guidelines;progressive activity/exercise;risk factor education   Risk & Benefits of therapy have been explained evaluation/treatment results reviewed;care plan/treatment goals reviewed;risks/benefits reviewed;participants included;patient   Clinical Impression Comments PT evaluation completed in ED.  Pt was able to tolerate ambulation with single crutch on right.  Pt has poor tolerance due to pain however is not a barrier to being able to go home. Although pt did not physically complete stairs, based on his tolerance for ambulation do not anticipate that he will have any difficulty.   Provided education and additional training.  Pt has crutches.  No further skilled PT during acute care stay.  May  require outpatient PT for HS tear once thumb has been addressed.   PT Discharge Planning   PT Discharge Recommendation (DC Rec) home;home with assist   PT Rationale for DC Rec PT evaluation completed in ED.  Pt was able to tolerate ambulation with single crutch on right.  Pt has poor tolerance due to pain however is not a barrier to being able to go home. Although pt did not physically complete stairs, based on his tolerance for ambulation do not anticipate that he will have any difficulty.   Provided education and additional training.  Pt has crutches.  No further skilled PT during acute care stay.  May require outpatient PT for HS tear once thumb has been addressed.   PT Brief overview of current status sup<>sit mod I, sit>stand SBA, ambulated 50' in room with single crutch on right SBA.  Poor tolerance for sitting due to pain   Total Evaluation Time   Total Evaluation Time (Minutes) 12   Physical Therapy Goals   PT Frequency One time eval and treatment only   PT Predicted Duration/Target Date for Goal Attainment 08/18/22   PT Goals Bed Mobility;Transfers;Gait   PT: Bed Mobility Modified independent;Supine to/from sit;Goal Met   PT: Transfers Supervision/stand-by assist;Sit to/from stand;Assistive device;Goal Met   PT: Gait Supervision/stand-by assist;Crutches;50 feet;Goal Met

## 2022-08-18 NOTE — CARE PLAN
Pt in bed with ice pack to left posterior thigh.  Pt denies any tenderness to thigh with palpation, but states it is very painful when he tries to sit upright.  Discussed with pt modified ways to try to sit as well as modified ways to try to sit on the toilet.  Pt attempted and was able to ambulate to the bathroom using one crutch on the right.  Pt sat down on the toilet with assist from RN with lifting left thigh up as he lowered himself down to the toilet.  Pt then positioned himself mostly on his right buttocks and used the railing in the bathroom for support.  Pt tolerated activity fair, stated it was painful while attempted to sit.  Pt able to ambulate back to bed using 1 crutch on the right, able to get himself into bed independently.  A new ice pack was applied to the left thigh.  Breakfast was ordered.  Will continue to monitor pt and provide cares as needed.

## 2022-08-18 NOTE — CARE PLAN
Prior to Admission Medication Reconciliation:     Medications added:   [] None  [x] As listed below:    OTC supplements: relaxium sleep aid and NeuroPure    Kenalog crm- no recent rx verification available- listed at [place]    cialis- no recent rx verification available- listed at [facility]    losartan    Medications deleted:   [] None  [x] As listed below:    Losartan/hctz     Medications marked for review/removal by attending:  [x] None  [] As listed below:    Changes made to existing medications:   [] None  [x] Updated strengths and frequencies to most current.   [] As listed below:      Pertinent notes/medications patient takes different than prescribed:     none    Last times/dates taken verified with patient:  [x] Yes- completed myself  [] Prepared PTA medlist for review only. (will not be available to review personally)  [] Did not review with patient. Rx verification only. Review completed by nursing.    [] Nurse completed no changes made (double checked entries)  [] Unable to review with patient at this time:    Allergies listed at another location:  [x]Allergies match allergies listed in Epic  []Other allergies listed:    Allergy review:    [x]Did not review: reviewed by nursing  []Did not review: pt unable at this time  []Verified current existing allergies or NKDA: no changes made   []Patient confirmed current existing allergies and new allergies added:    Medication reconciliation sources:   [x]Patient  []Patient family member/emergency contact: **  [x][facility] Report Review: 2019  [x]Epic Chart Review  [x]Care Everywhere review: [place]/ [facility]  []Pharmacy med list/phone call: **  []Outside meds dispense report: see below  []Nursing home or Assisted Living MAR:  []Other: **    Pharmacy desired at discharge: [facility]    Is patient on coumadin?   [x]No  []Yes    Requests for consultation by provider or pharmacist:   [x] Patient understands why all of their meds were prescribed and how to take them. No  questions.   [] Managing party has no questions.   [] Patient/ managing party has questions about the following:  [] Did not review with patient. Cannot assess.     Fill dates and reported compliancy:  [x] Fill dates coincide with reported compliancy for all/most maintenance meds.   [] Not applicable. Patient is not taking any maintenance medications at this time.   [] Fill dates do not coincide with compliancy with maintenance meds. See notes in PTA medlist and in comments.    [x] Fill dates do not coincide with the following medications but pt reports compliancy: amlodipine  [] Did not review with patient. Cannot assess.     Historian accuracy:  [x] Excellent- alert and oriented, understands why meds were prescribed and how to take, able to answer specifics  [] Good- alert and oriented, understands why meds were prescribed and how to take, some confusion   [] Fair- alert and oriented, doesn't know medications without list, cannot answer specifics about medications, but has a decent process for which to take at home  [] Poor- does not know medications, may not have a process to take at home, may be cognitively unable to review at this time  []Medication management done by family member or facility, no concerns about historian accuracy.   [] Did not review with patient. Cannot assess.     Medication Management:  [x] Manages meds independently  [] Family member/ other party manages meds/assists:  [] Meds managed by staff at facility  [] Meds set up by home care, family/other party helps administer  [] Meds set up by home care, self administers  [] Did not review with patient. Cannot assess.     Other medications aside from PTA:  [x] Denies taking any medications aside from those listed in PTA meds  [] Reports taking another medication(s) but cannot recall the name(s)  [] Refuses to say.  [] Did not review with patient. Cannot assess.     Comments: pt reports excellent compliancy.       Cecelia Morales on 8/18/2022 at  10:19 AM       Notifying appropriate party of changes/additions/discrepancies:  [x]No pertinent changes made, notification not necessary.   [] Notified attending provider via text page/phone call  [] Notified attending provider in person  [] Notified pharmacy  [] Notified nurse  [] Medications have not been reconciled by a provider yet, notification not necessary  [] Pt is not admitted to floor yet, PTA meds completed before admission.                                         Medication Dispense History (from 8/18/2021 to 8/18/2022)  Expand All  Collapse All    Budesonide     Dispensed Days Supply Quantity Provider Pharmacy   BUDESONIDE 3MG/24HR CAP 02/17/2022 30 90 Units Stephenie De La Fuente NP [id number] ...   BUDESONIDE 3MG/24HR CAP 01/25/2022 30 90 Units STEPHENIE DE LA FUENTE [facility] ...        Cephalexin     Dispensed Days Supply Quantity Provider Pharmacy   CEPHALEXIN 500MG    CAP 08/17/2022 7 28 Units Gómez Tomas DO [facility] ...   CEPHALEXIN 500MG    CAP 10/05/2021 10 30 Units Jarvis Hopper MD, MD [facility] ...        Cholestyramine     Dispensed Days Supply Quantity Provider Pharmacy   CHOLESTYRAM 4GM PKT POW 01/10/2022 10 10 [facility] ...        Ciprofloxacin HCl     Dispensed Days Supply Quantity Provider Pharmacy   CIPROFLOXACN 250MG   TAB 01/14/2022 10 20 Units JARVIS HOPPER [facility] ...        Cyclobenzaprine HCl     Dispensed Days Supply Quantity Provider Pharmacy   CYCLOBENZAPR 10MG   TAB 08/17/2022 7 20 Units Gómez Tomas DO [facility] ...        HYDROcodone-Acetaminophen     Dispensed Days Supply Quantity Provider Pharmacy   HYDROCOD/ACETAM 5-325MG TAB 08/17/2022 5 18 Units Gómez Tomas DO [facility] ...        Losartan Potassium     Dispensed Days Supply Quantity Provider Pharmacy   LOSARTAN POTASSIUM 100MG TAB 08/15/2022 90 90 tablet [facility]   LOSARTAN POTASSIUM 100MG TAB 05/24/2022 90 90 tablet Jarvis Hopper MD, MD [facility] PRESCRIPTIO   LOSARTAN POTASSIUM 100MG TAB 02/14/2022 90 90 tablet Jarvis Hopper MD, MD [facility] PRESCRIPTIO        Ondansetron HCl     Dispensed Days Supply Quantity Provider Pharmacy   ONDANSETRON 4MG     TAB 01/17/2022 1 2 Units [facility] ...        PEG 3350-KCl-Na Bicarb-NaCl     Dispensed Days Supply Quantity Provider Pharmacy   PEG-3350/KCL/SODIUM SOL 01/17/2022 1 4,000 mL [facility] ...        SUMAtriptan Succinate     Dispensed Days Supply Quantity Provider Pharmacy   SUMATRIPTAN SUCCINATE 50MG TAB 08/15/2022 90 45 tablet Jarvis Hopper MD, MD [facility] PRESCRIPTIO   SUMATRIPTAN SUCCINATE 50MG TAB 05/24/2022 90 45 tablet Jarvis Hopper MD, MD [facility] PRESCRIPTIO   SUMATRIPTAN SUCCINATE 50MG TAB 03/02/2022 90 45 tablet Jarvis Hopper MD, MD [facility] PRESCRIPTIO        amLODIPine Besylate     Dispensed Days Supply Quantity Provider Pharmacy   AMLODIPINE BESYLATE 5MG TAB 02/15/2022 90 90 tablet Jarvis Hopper MD, MD [facility] PRESCRIPTIO        metroNIDAZOLE     Dispensed Days Supply Quantity Provider Pharmacy   METRONIDAZOLE 500MG TAB 01/05/2022 7 21 Units [facility] ...        Disclaimer    Certain dispenses may not be available or accurate in this report, including over-the-counter medications, low cost prescriptions, prescriptions paid for by the patient or non-participating sources, or errors in insurance claims information. The provider should independently verify medication history with the patient.    External Sources

## 2022-08-18 NOTE — PHARMACY
[facility]    Pharmacy      Antimicrobial Stewardship Note     Current antimicrobial therapy:  Anti-infectives (From now, onward)    Start     Dose/Rate Route Frequency Ordered Stop    08/18/22 0550  ceFAZolin (ANCEF) intermittent infusion 1 g            1 g  over 30 Minutes Intravenous EVERY 8 HOURS 08/18/22 0548      08/17/22 0000  cephALEXin (KEFLEX) 500 MG capsule         500 mg Oral 4 TIMES DAILY 08/17/22 1543 08/24/22 8689          Indication: SSTI        Culture Results:  none       Recommendations/Interventions:  1. Consider oral antibiotics.    Fidelia Li [facility]  August 18, 2022

## 2022-08-18 NOTE — PROGRESS NOTES
08/18/22 0928   Signing Clinician's Name / Credentials   Signing clinician's name / credentials Reina Hough DPT   Quick Adds   Rehab Discipline PT   Gait Training   Distance in Feet (Required for LE Total Joints) 50'   Therapeutic Activity   Minutes of Treatment 10 minutes   Symptoms Noted During/After Treatment Increased pain   Treatment Detail Provided thorough education regarding pt's current status and pain level.  Discussed that the tolerance for sitting was likely going to be an issue for some time however there wasn't anything significant to change that at this time besides ice and pain medications.  Did instruct pt to work on gentle heel slides to maintain motion and strongly encouraged ambulation.  Did provide pt with 2 ice packs as he hadn't had any ice on injury site since coming into ED and strongly encouraged to continue to ask nursing staff for ice.  Discussed possible reclining of passenger seat or getting into back seat of car in order to avoid direct pressure on HS.  DIscussed no further skilled PT during hospitalization.   PT Discharge Planning   PT Discharge Recommendation (DC Rec) home;home with assist   PT Rationale for DC Rec PT evaluation completed in ED.  Pt was able to tolerate ambulation with single crutch on right.  Pt has poor tolerance due to pain however is not a barrier to being able to go home. Although pt did not physically complete stairs, based on his tolerance for ambulation do not anticipate that he will have any difficulty.   Provided education and additional training.  Pt has crutches.  No further skilled PT during acute care stay.  May require outpatient PT for HS tear once thumb has been addressed.   PT Brief overview of current status sup<>sit mod I, sit>stand SBA, ambulated 50' in room with single crutch on right SBA.  Poor tolerance for sitting due to pain   Additional Documentation   Rehab Comments pain limiting sitting otherwise doing well   PT Plan No further PT,  continue to mobilize with nursing until pt feels he is ready to discharge.   Total Session Time   Total Session Time (minutes) 20 minutes  (10 eval, 10 treatment)

## 2022-08-18 NOTE — ED NOTES
"Pt presents with open fx to the left thumb.  See media files for photos.  Pt has sensation at the tip of the thumb, fx located distal to the MIP joint.  Extremity movement proximal to the MIP joint intact.  CMS of extremity intact.  Left thumb dressed with wet to dry 4x4 dressing and wrapped in kerlix.  Pt also complaining of pain in the back of his left thigh.  \"It feels like I pulled a muscle.\"  Pt had 2 liters of NS infusing on arrival by EMS.    "
"Stood up at edge of bed to place gown. Pt notably unsteady with difficulty bearing small amount of weight on left leg. Able to stand for approximately 30 seconds before needing assistance back in bed dt increased pain. Ace wrap to LLE in place with pulses palpable. Pt expressed concern with being able to ambulate at home dt left hand/leg injuries. Stated, \"it's too difficult to use the crutches I was given with my hand not working right\". Is aware it will take time to get used to crutches for ambulation. Encouraged use of call light for staff assistance when needed for pt safety. Will continue to monitor.     0110 - Pt reporting 7/10 pain. 1 mg IV dilaudid ordered/administered.     0250 - Pain tolerable at this time time. Pt states, \"I'm still feeling pain, but I can tell it's not numb anymore\". Asked if pt would like anything else to help manage pain. Declines at this time.     0442 - Requesting pain meds following bedside assessment with Dr. Holm. Orthostatics completed with no significant changes. Pt stood at bedside and attempted to take a few steps with staff assistance. Cnts having difficulty standing for longer than 30 seconds. Assisted back to bed with 1 mg IV dilaudid ordered/administered    Face to face report given with opportunity to observe patient.    Report given to CRISTINO Chou RN   8/18/2022  7:00 AM       "
Bed: ED09a  Expected date:   Expected time:   Means of arrival:   Comments:  CRITICAL  
Face to face report given with opportunity to observe patient.    Report given to CRISTINO Jaimes RN   8/17/2022  7:23 PM      
In to discharge patient.  Discharge instructions reviewed with patient and his wife.  Both verbalize understanding of instructions and follow up.  Pt able to stand to discharge, but pain in hamstring 10/10, pt unable to bear weight or sit down in wheelchair.  Provider notified and order for flexeril obtained.    
Provider in room will do EKG when provider is done  
Pt in room waiting to see if flexeril helps.  Call light in reach if further help needed, otherwise patient is free to leave.  Discharge complete.  
Pt still unable to sit in a wheelchair due to pain.  Pt feels like he is going to pass out from pain.  Pt attempted crutches, but is unable to use due to broken left thumb.  Provider notified.  
Pt swabbed for COVID.  Pt reports he tested positive in June.  Pt's wife states positive home test on June 12, 2022.  Pt has no COVID sx.  
Pt's left thigh wrapped with ace wrap, Pt tolerated well.  
No

## 2022-08-18 NOTE — PLAN OF CARE
Physical Therapy Discharge Summary    Reason for therapy discharge:    All goals and outcomes met, no further needs identified.    Progress towards therapy goal(s). See goals on Care Plan in Epic electronic health record for goal details.  Goals met    Therapy recommendation(s):    May require outpatient PT for HS tear once thumb addressed    Goal Outcome Evaluation:

## 2022-08-18 NOTE — PLAN OF CARE
"[facility] Admission Note:    Patient admitted to 3104/3104-1 at approximately 1127 via bed accompanied by spouse from emergency room. Report received from CRISTINO Alvarado in SBAR format at 1057 via telephone. Patient ambulated to bed via self.. Patient is alert and oriented X 3, reports pain; rates at 8 on 0-10 scale.  Patient oriented to room, unit, hourly rounding, and plan of care. Explained admission packet and patient handbook with patient bill of rights brochure. Will continue to monitor and document as needed.     Inpatient Nursing criteria listed below was met:    Health care directives status obtained and documented: Yes    Patient identifies a surrogate decision maker: No    If initial lactic acid greater than 2.0, repeat lactic acid drawn within one hour of arrival to unit: NA.    Clergy visit ordered if patient requests: N/A    Skin issues/needs documented: Yes    Isolation Patient: no Education given, correct sign in place and documentation row added to PCS:  N/A    Fall Prevention Yes: Care plan updated, education given and documented, sticker and magnet in place: Yes    Care Plan initiated: Yes    Education Documented (including assessment): Yes    Patient has discharge needs : Yes If yes, please explain: TBD      /82 (BP Location: Left arm, Patient Position: Semi-Toro's, Cuff Size: Adult Regular)   Pulse 70   Temp 99.5  F (37.5  C) (Tympanic)   Resp 18   Ht 1.803 m (5' 11\")   Wt 104.9 kg (231 lb 4.2 oz)   SpO2 93%   BMI 32.25 kg/m      Pt A&O, VSS, max temp of 99.5, and reports pain rated 6-8/10. PRN tylenol and oxycodone given and cold therapy applied with some relief. Per ICU report, telemetry: SR 80s. CMS intact to all extremities. Dressing to left hand/thumb CDI. Patient using bedside urinal and voiding adequately. Rest of assessment as charted. IV patent and SL. Per patient request, he prefers to have one of his upper side rails raised and would like the bed slightly " raised to make transferring less painful. Patient transfers as SBA while utilizing 1 crutch on his right side. Call light within reach and makes needs known.    Face to face report given with opportunity to observe patient.    Report given to CRISTINO Ryder RN   8/18/2022

## 2022-08-18 NOTE — SIGNIFICANT EVENT
Significant Event Note    Time of event: 6:45 AM August 18, 2022    Description of event:  I spoke with Dr. Norton, general surgeon, regarding patient's left thumb laceration associated with fracture.  He recommends strongly that I speak with hand surgeon in light of this laceration involving open wound fracture.  I spoke with Dr. Martin, hand surgeon at [facility], who recommended patient being seen by him either today or or tomorrow.  He is at [facility] hand surgery clinic/plastic surgery clinic today from 8 AM to 12 noon, phone number [phone].  He will be at his plastic surgery clinic tomorrow and the phone number there is a [phone].  I spoke with patient regarding this and he declined being transferred right away.  He wanted to see if his pain has improved and decide.  I urged him to consider this as we can call ambulance to transport him there.  Physical therapy has been ordered for evaluation of his functional status.    Plan:  Pain medication has been given, patient will see if his pain improves and will decide when to go to an surgery.  He also stated that he knows hand surgeon that he would like to talk to prior to making the decision.    Discussed with: bedside nurse and patient    Paul Holm MD

## 2022-08-19 VITALS
WEIGHT: 231.26 LBS | HEIGHT: 71 IN | BODY MASS INDEX: 32.38 KG/M2 | OXYGEN SATURATION: 94 % | DIASTOLIC BLOOD PRESSURE: 85 MMHG | TEMPERATURE: 99 F | SYSTOLIC BLOOD PRESSURE: 152 MMHG | RESPIRATION RATE: 16 BRPM | HEART RATE: 81 BPM

## 2022-08-19 LAB
ALBUMIN SERPL-MCNC: 3.3 G/DL (ref 3.4–5)
ANION GAP SERPL CALCULATED.3IONS-SCNC: 5 MMOL/L (ref 3–14)
BUN SERPL-MCNC: 16 MG/DL (ref 7–30)
CALCIUM SERPL-MCNC: 8.7 MG/DL (ref 8.5–10.1)
CHLORIDE BLD-SCNC: 107 MMOL/L (ref 94–109)
CO2 SERPL-SCNC: 25 MMOL/L (ref 20–32)
CREAT SERPL-MCNC: 1.32 MG/DL (ref 0.66–1.25)
ERYTHROCYTE [DISTWIDTH] IN BLOOD BY AUTOMATED COUNT: 13.2 % (ref 10–15)
GFR SERPL CREATININE-BSD FRML MDRD: 59 ML/MIN/1.73M2
GLUCOSE BLD-MCNC: 113 MG/DL (ref 70–99)
HCT VFR BLD AUTO: 37.5 % (ref 40–53)
HGB BLD-MCNC: 13.3 G/DL (ref 13.3–17.7)
MCH RBC QN AUTO: 31.2 PG (ref 26.5–33)
MCHC RBC AUTO-ENTMCNC: 35.5 G/DL (ref 31.5–36.5)
MCV RBC AUTO: 88 FL (ref 78–100)
PHOSPHATE SERPL-MCNC: 4 MG/DL (ref 2.5–4.5)
PLATELET # BLD AUTO: 156 10E3/UL (ref 150–450)
POTASSIUM BLD-SCNC: 4 MMOL/L (ref 3.4–5.3)
RBC # BLD AUTO: 4.26 10E6/UL (ref 4.4–5.9)
SODIUM SERPL-SCNC: 137 MMOL/L (ref 133–144)
WBC # BLD AUTO: 6.2 10E3/UL (ref 4–11)

## 2022-08-19 PROCEDURE — G0378 HOSPITAL OBSERVATION PER HR: HCPCS

## 2022-08-19 PROCEDURE — 36415 COLL VENOUS BLD VENIPUNCTURE: CPT | Performed by: INTERNAL MEDICINE

## 2022-08-19 PROCEDURE — 96376 TX/PRO/DX INJ SAME DRUG ADON: CPT

## 2022-08-19 PROCEDURE — 85027 COMPLETE CBC AUTOMATED: CPT | Performed by: INTERNAL MEDICINE

## 2022-08-19 PROCEDURE — 82040 ASSAY OF SERUM ALBUMIN: CPT | Performed by: INTERNAL MEDICINE

## 2022-08-19 PROCEDURE — 250N000011 HC RX IP 250 OP 636: Performed by: INTERNAL MEDICINE

## 2022-08-19 PROCEDURE — 99217 PR OBSERVATION CARE DISCHARGE: CPT | Performed by: INTERNAL MEDICINE

## 2022-08-19 PROCEDURE — 250N000013 HC RX MED GY IP 250 OP 250 PS 637: Performed by: INTERNAL MEDICINE

## 2022-08-19 RX ADMIN — LOSARTAN POTASSIUM 100 MG: 50 TABLET, FILM COATED ORAL at 08:27

## 2022-08-19 RX ADMIN — OXYCODONE HYDROCHLORIDE 10 MG: 5 TABLET ORAL at 01:41

## 2022-08-19 RX ADMIN — ASPIRIN 81 MG: 81 TABLET, COATED ORAL at 08:27

## 2022-08-19 RX ADMIN — KETOROLAC TROMETHAMINE 15 MG: 15 INJECTION, SOLUTION INTRAMUSCULAR; INTRAVENOUS at 05:18

## 2022-08-19 RX ADMIN — CEFAZOLIN SODIUM 1 G: 1 INJECTION, SOLUTION INTRAVENOUS at 05:22

## 2022-08-19 RX ADMIN — OXYCODONE HYDROCHLORIDE 10 MG: 5 TABLET ORAL at 08:27

## 2022-08-19 RX ADMIN — AMLODIPINE BESYLATE 5 MG: 5 TABLET ORAL at 08:27

## 2022-08-19 ASSESSMENT — ACTIVITIES OF DAILY LIVING (ADL)
ADLS_ACUITY_SCORE: 20
ADLS_ACUITY_SCORE: 22
ADLS_ACUITY_SCORE: 22
ADLS_ACUITY_SCORE: 20
ADLS_ACUITY_SCORE: 20

## 2022-08-19 NOTE — PLAN OF CARE
"/85 (BP Location: Left arm, Patient Position: Supine, Cuff Size: Adult Regular)   Pulse 81   Temp 99  F (37.2  C) (Tympanic)   Resp 16   Ht 1.803 m (5' 11\")   Wt 104.9 kg (231 lb 4.2 oz)   SpO2 94%   BMI 32.25 kg/m      Patient A&O, VSS, afebrile, and reports pain rated 6/10. PRN oxycodone given prior to discharge per patient's request.     Patient discharged at 9:00 AM via ambulation and accompanied by spouse and staff. Patient utilizing 1 crutch to his right side with ambulation. Prescriptions sent to patients preferred pharmacy. All belongings and DVD sent with patient and spouse.    Discharge instructions reviewed with patient and spouse. Listed belongings gathered and returned to patient. Yes; see flowsheets.    Patient discharged to home..     Surgical Patient   Surgical Procedures during stay: Yes  Did patient receive discharge instruction on wound care and recognition of infection symptoms? Yes    MISC  Follow up appointment made:  No; [facility] in [place] will call patient to schedule an appointment. Patient will call clinic if he does not hear from them by the end of they day. Phone number provided in discharge instructions.  Home medications returned to patient: N/A  Patient reports pain was well managed at discharge: Yes  "

## 2022-08-19 NOTE — PLAN OF CARE
"/80 (BP Location: Left arm, Patient Position: Supine, Cuff Size: Adult Regular)   Pulse 75   Temp 99.1  F (37.3  C) (Tympanic)   Resp 18   Ht 1.803 m (5' 11\")   Wt 104.9 kg (231 lb 4.2 oz)   SpO2 93%   BMI 32.25 kg/m      Pt A&O, VS and assessment as charted, standby assist with one crutch, free of falls or injury this shift. CMS intact. Dressing CDI. On tele - SR 60s-70s per ICU tele report. Temps 99.1. Pain 7-8/10 to L hand/thumb and thigh/butt, has received prn oxy and toradol. Ace wrap to L thigh per pt request.    Face to face report given with opportunity to observe patient.    Report given to CRISTINO Box RN   8/19/2022  7:26 AM      "

## 2022-08-19 NOTE — DISCHARGE SUMMARY
[facility]  Hospitalist Discharge Summary      Date of Admission:  8/17/2022  Date of Discharge:  8/19/2022  Discharging Provider: Jarvis Strong MD  Discharge Service: Hospitalist Service    Discharge Diagnoses     Open comminuted fracture of the distal first phalanx with fracture line likely extending into the joint line  Probable left hamstring tear with hematoma.  Hypertension      Follow-ups Needed After Discharge   Follow-up Appointments     Follow-up and recommended labs and tests       Follow up with primary care provider, Rishi Zaldivar, within 7 days for   hospital follow- up.  No follow up labs or test are needed.      Patient has an appointment today at [facility] plastic surgery at the [facility] in [place] with Dr. Martin for evaluation of his left thumb   fracture.  We will send copies of his notes along with a disc showing the   x-ray of his left hand.             Unresulted Labs Ordered in the Past 30 Days of this Admission     No orders found from 7/18/2022 to 8/18/2022.          Discharge Disposition   Discharged to home.  Patient will be going to Dr. Martin's clinic today at 1115 at the [facility] for evaluation of his left thumb fracture.    Condition at discharge: Stable       Hospital Course   Patient is a 67-year-old gentleman who was in his usual state of health when he came upon a [employer] nest and as he was running he slipped on the wet grass was carrying a piece of wood in his left hand and fell to the ground.  His left thumb had a significant laceration.  He had significant discomfort in his left posterior thigh also.  He was brought via EMS to our emergency room.  He was in the fair amount of discomfort.  Almost felt as though he was going to pass out from the pain.  He did not get stung.    He did have a significant laceration over the dorsal portion of his thumb.  X-rays showed a comminuted fracture with the fracture line likely extending into that  joint space of the distal phalanx.  The ER physician contacted orthopedic surgery.  They recommended pain control oral antibiotics splinting and close outpatient follow-up.  The ER physician placed him on Ancef and closed the wound by suturing.  Was placed in a splint.  The tentative plan was for the patient be discharged however his left leg was extremely painful.  The CT scan was performed which showed probable hamstring tear.  There was an area of hematoma.  No evidence of compartment syndrome.  He was really unable to sit was only able to stand..    The next morning after the patient had been in the ER Dr. Martin was contacted who was on-call for [facility] hand surgery.  We had concerns regarding the fact he has an open fracture of that thumb which was closed.  He recommended that the patient be seen either yesterday or today.  He was in clinic on both days.  Were unable to get the patient up moving well enough to be seen yesterday morning.  He was admitted overnight for some pain control for his left hamstring pain.  We did arrange an outpatient appointment with Dr. Martin today at 1115 at the [facility] Plastic Surgery.    Patient still is having difficulty with pain in that left leg.  Not able to sit very well at all.  He will be discharged however using a crutch and his wife will drive him to that appointment.  He does have pain medication ordered also will continue with oral antibiotic which was prescribed already.  The left hand was left in the splint this was not removed.  CMS is intact.  His left leg has just trace edema.      Consultations This Hospital Stay   PHYSICAL THERAPY ADULT IP CONSULT  OCCUPATIONAL THERAPY ADULT IP CONSULT  SURGERY GENERAL IP CONSULT    Code Status   Full Code    Time Spent on this Encounter   I, Jarvis Strong MD, personally saw the patient today and spent greater than 30 minutes discharging this patient.       Jarvis Strong MD  [facility]  SURGICAL  [address] [place] [address]  [place] [address]  Phone: [phone]  Fax: [phone]  ______________________________________________________________________    Physical Exam   Vital Signs: Temp: 99.1  F (37.3  C) Temp src: Tympanic BP: 152/85 Pulse: 81   Resp: 16 SpO2: 94 % O2 Device: None (Room air)    Weight: 231 lbs 4.2 oz  Constitutional: awake, alert, cooperative, no apparent distress, and appears stated age  Respiratory: No increased work of breathing, good air exchange, clear to auscultation bilaterally, no crackles or wheezing  Cardiovascular: Normal apical impulse, regular rate and rhythm, normal S1 and S2, no S3 or S4, and no murmur noted  GI: No scars, normal bowel sounds, soft, non-distended, non-tender, no masses palpated, no hepatosplenomegally  Musculoskeletal: Left leg there is trace edema.  Still is tender in that posterior thigh area.  No obvious bruising.  Is able to stand.  His left thumb is placed in a splint.  The rest of his fingers wrist all are within normal limits.  There is no drainage noted.       Primary Care Physician   Rishi Zaldivar    Discharge Orders      Orthopedic  Referral      Reason for your hospital stay    Patient suffered a fall with a fracture and laceration of his left distal thumb also left hamstring injury.     Follow-up and recommended labs and tests     Follow up with primary care provider, Rishi Zaldivar, within 7 days for hospital follow- up.  No follow up labs or test are needed.      Patient has an appointment today at [facility] plastic surgery at the [facility] in [place] with Dr. Martin for evaluation of his left thumb fracture.  We will send copies of his notes along with a disc showing the x-ray of his left hand.     Activity    Your activity upon discharge: activity as tolerated     Diet    Follow this diet upon discharge: Orders Placed This Encounter      Regular Diet Adult       Significant Results and Procedures   Most Recent 3  CBC's:Recent Labs   Lab Test 08/19/22  0502 08/18/22  0344 08/17/22  1754   WBC 6.2 7.0 8.7   HGB 13.3 13.0* 13.7   MCV 88 88 87    165 156     Most Recent 3 BMP's:Recent Labs   Lab Test 08/19/22  0502 08/18/22  0344 08/17/22  1754    139 139   POTASSIUM 4.0 3.9 3.9   CHLORIDE 107 110* 109   CO2 25 25 24   BUN 16 17 15   CR 1.32* 1.33* 1.32*   ANIONGAP 5 4 6   AGUS 8.7 8.1* 8.1*   * 100* 104*   ,   Results for orders placed or performed during the hospital encounter of 08/17/22   XR Finger Left 2 Views    Narrative    PROCEDURE:  XR FINGER LEFT G/E 2 VIEWS    HISTORY: trauma    COMPARISON:  None.    TECHNIQUE:  3 view left finger.    FINDINGS:  Radiopaque packing material projects around the first  digit. Transverse fracture through the distal first phalanx with  suspected comminuted fracture line extending towards the anterolateral  interphalangeal joint space. Soft tissue irregularity about the distal  first phalanx.     Advanced first carpometacarpal joint and triscaphe joint degenerative  changes. The joint spaces are otherwise preserved. No foreign body is  seen.       Impression    IMPRESSION: Acute comminuted fracture of the distal first phalanx with  fracture line likely extending to the joint line.      ARA SCHROEDER MD         SYSTEM ID:  [id number]   CT Femur Thigh Left w/o Contrast    Narrative    EXAMINATION: CT FEMUR THIGH LEFT W/O CONTRAST, 8/17/2022 2:47 PM    COMPARISON: None    HISTORY: posterior left upper leg injury    Technique:  Imaging protocol: Multiplanar CT images of the left femur without  contrast.   Acquisition: This CT exam was performed using one or more the  following dose reduction techniques: automated exposure control,  adjustment of the mA and/or kV according to patient size, and/or  iterative reconstruction technique.    FINDINGS:    SOFT TISSUES:    Heterogeneous density in the posterior compartment of the thigh,  consistent with intramuscular hematoma. Prominent  "serpiginous course  of the proximal myotendinous semimembranosus. Mild prepatellar soft  tissue edema. No large effusion of the knee or left hip.    BONES:  No suspicious osseous lesions. Fibro-osseous lesion in the distal  femur, favored enchondroma. Degenerative periarticular changes.    VISUALIZED PELVIS:  Limited evaluation of the pelvis demonstrates no acute parenchymal  abnormalities, nonobstructive bowel gas pattern, and no free fluid or  free air. Small fat containing left inguinal hernia. No  lymphadenopathy.       Impression    IMPRESSION:  Left posterior compartment intramuscular hematoma, likely associated  with hamstring tear.    ARA SCHROEDER MD         SYSTEM ID:  [id number]       Discharge Medications   Current Discharge Medication List      START taking these medications    Details   cephALEXin (KEFLEX) 500 MG capsule Take 1 capsule (500 mg) by mouth 4 times daily for 7 days  Qty: 28 capsule, Refills: 0      cyclobenzaprine (FLEXERIL) 10 MG tablet Take 1 tablet (10 mg) by mouth 3 times daily as needed for muscle spasms  Qty: 20 tablet, Refills: 0      HYDROcodone-acetaminophen (NORCO) 5-325 MG tablet Take 1 tablet by mouth every 6 hours as needed for pain  Qty: 18 tablet, Refills: 0         CONTINUE these medications which have NOT CHANGED    Details   amLODIPine (NORVASC) 5 MG tablet Take 5 mg by mouth daily       aspirin 81 MG EC tablet Take 81 mg by mouth daily      losartan (COZAAR) 100 MG tablet Take 100 mg by mouth daily      !! OVER-THE-COUNTER Take 1 capsule by mouth 2 times daily \"NeuroPure\"      !! OVER-THE-COUNTER Take 1 capsule by mouth At Bedtime \"Relaxium- Sleep Aid\"      SUMAtriptan (IMITREX) 50 MG tablet Take 50 mg by mouth at onset of headache . May repeat in 2 hours if needed. Max treatment of 4 headaches in a 30 day period.      tadalafil (CIALIS) 20 MG tablet Take 10-20 mg by mouth daily as needed      triamcinolone (KENALOG) 0.1 % external cream Apply topically 2 times daily as " needed       !! - Potential duplicate medications found. Please discuss with provider.        Allergies   No Known Allergies

## 2022-08-19 NOTE — PHARMACY - DISCHARGE MEDICATION RECONCILIATION AND EDUCATION
Pharmacist provided medication teaching for discharge with a focus on new medications/dose changes.  The discharge medication list was reviewed with the patient/family and the following points were discussed, as applicable: Name, description, purpose, special storage requirements, common side effects, action to be taken if dose is missed, when to call MD and safe disposal of unused medications.    Specifically discussed pain control, bowel regimen for constipation, and my concern that Norco 5/325 mg every 6 hours may not be enough to control pain, since patient has been getting 10mg oxycodone while in hospital. He is going to bring this up at his appointment in [place] this morning.    Fidelia Li, PharmD on 8/19/2022

## 2022-08-19 NOTE — PLAN OF CARE
Free from falls/injuries this shift. Med with roxycodone 10mg at 1730 which made pain at comfortable level. This evening, pain was increasing. Med with 10mg roxycodone, toradol, tylenol and then within 15 minutes pt felt pain was becoming intolerable and requested IV Dilaudid. Also felt he was getting migrane and req his usual med that he takes fir migranes. Text page to Dr Holm and order for Imitrex rec'd-pt med with imitrex. Continues on IV Ancef. Ice pack to back of Left leg- no bruising noted but feels pain had increased this evening. L)  Thumb CDI.  Telemetry intact.     Face to face report given with opportunity to observe patient.    Report given to Breonna Maravilla RN   8/18/2022  11:48 PM

## 2022-08-21 ENCOUNTER — HOSPITAL ENCOUNTER (EMERGENCY)
Facility: HOSPITAL | Age: 67
Discharge: HOME | End: 2022-08-21
Attending: EMERGENCY MEDICINE | Admitting: EMERGENCY MEDICINE
Payer: COMMERCIAL

## 2022-08-21 VITALS
HEART RATE: 82 BPM | RESPIRATION RATE: 14 BRPM | SYSTOLIC BLOOD PRESSURE: 151 MMHG | DIASTOLIC BLOOD PRESSURE: 90 MMHG | OXYGEN SATURATION: 99 % | TEMPERATURE: 98.2 F

## 2022-08-21 DIAGNOSIS — M79.645 PAIN OF LEFT THUMB: ICD-10-CM

## 2022-08-21 PROCEDURE — 99283 EMERGENCY DEPT VISIT LOW MDM: CPT | Performed by: EMERGENCY MEDICINE

## 2022-08-21 PROCEDURE — 99283 EMERGENCY DEPT VISIT LOW MDM: CPT

## 2022-08-21 PROCEDURE — 250N000013 HC RX MED GY IP 250 OP 250 PS 637: Performed by: EMERGENCY MEDICINE

## 2022-08-21 RX ORDER — DOXYCYCLINE 100 MG/1
100 CAPSULE ORAL ONCE
Status: COMPLETED | OUTPATIENT
Start: 2022-08-21 | End: 2022-08-21

## 2022-08-21 RX ORDER — DOXYCYCLINE 100 MG/1
100 CAPSULE ORAL 2 TIMES DAILY
Qty: 14 CAPSULE | Refills: 0 | Status: SHIPPED | OUTPATIENT
Start: 2022-08-21 | End: 2022-08-28

## 2022-08-21 RX ADMIN — DOXYCYCLINE HYCLATE 100 MG: 100 CAPSULE ORAL at 23:28

## 2022-08-21 ASSESSMENT — ACTIVITIES OF DAILY LIVING (ADL): ADLS_ACUITY_SCORE: 33

## 2022-08-22 NOTE — ED TRIAGE NOTES
Patient reports that he had his thumb sewn back on. Patient reports that he has had increased burning today and swelling and some red discharge on the wrap of the thumb. Patient is on antibiotic currently. Patient reports the pain in worse today.

## 2022-08-23 ASSESSMENT — ENCOUNTER SYMPTOMS
FEVER: 0
SHORTNESS OF BREATH: 0
CHILLS: 0

## 2022-08-24 NOTE — ED PROVIDER NOTES
History     Chief Complaint   Patient presents with     Wound Infection     HPI  Ned Pimentel is a 67 year old male who is here with complaint of pain to left thumb.  Recently admitted for open fracture, received IV antibiotics for a few days and was discharged home.  Had a follow-up with a hand surgeon in [place], [facility] appointment went well.  Has been taking Keflex.  States today he felt his thumb to be more painful and thinks it may be infected.  States it appears red, warm and thought he saw pus.  No fevers at home.  No nausea no vomiting.  No decrease in range of motion.    Allergies:  No Known Allergies    Problem List:    Patient Active Problem List    Diagnosis Date Noted     Lymphocytic colitis 08/18/2022     Priority: Medium     Fall 08/18/2022     Priority: Medium     Stage 3a chronic kidney disease (H) 08/18/2022     Priority: Medium     Open fracture of base of distal phalanx of left thumb 08/17/2022     Priority: Medium     Strain of left hamstring muscle, initial encounter 08/17/2022     Priority: Medium        Past Medical History:    Past Medical History:   Diagnosis Date     ED (erectile dysfunction)      Gout      Hypertension      Neuropathy      Upper GI bleed        Past Surgical History:    Past Surgical History:   Procedure Laterality Date     ARTHRODESIS ANKLE Right 9/18/2017    Procedure: ARTHRODESIS ANKLE;  REVISION TALONAVICULAR FUSION AND CALCANEALCUBOID FUSION (C-ARM,H PLATES, INFUSE);  Surgeon: Mary Jaeger MD;  Location: [facility]     arthroscopic knee surgery       HERNIA REPAIR      umbilical     ORTHOPEDIC SURGERY Right     foot surgery     SHOULDER SURGERY       SINUS SURGERY         Family History:    Family History   Problem Relation Age of Onset     Heart Disease Father         2 heart attacks by age 61     Heart Disease Brother         heart attack age 50       Social History:  Marital Status:   [2]  Social History     Tobacco Use     Smoking status: Never  Smoker     Smokeless tobacco: Never Used   Substance Use Topics     Alcohol use: No     Drug use: No        Medications:    amLODIPine (NORVASC) 5 MG tablet  aspirin 81 MG EC tablet  cephALEXin (KEFLEX) 500 MG capsule  cyclobenzaprine (FLEXERIL) 10 MG tablet  doxycycline hyclate (VIBRAMYCIN) 100 MG capsule  losartan (COZAAR) 100 MG tablet  SUMAtriptan (IMITREX) 50 MG tablet  OVER-THE-COUNTER  OVER-THE-COUNTER  tadalafil (CIALIS) 20 MG tablet  triamcinolone (KENALOG) 0.1 % external cream          Review of Systems   Constitutional: Negative for chills and fever.   Respiratory: Negative for shortness of breath.    Cardiovascular: Negative for chest pain.   All other systems reviewed and are negative.      Physical Exam   BP: 151/90  Pulse: 86  Temp: 98.2  F (36.8  C)  Resp: 16  SpO2: 94 %      Physical Exam  Constitutional:       General: He is not in acute distress.     Appearance: Normal appearance.   HENT:      Head: Normocephalic and atraumatic.      Right Ear: External ear normal.      Left Ear: External ear normal.      Nose: Nose normal. No rhinorrhea.   Eyes:      Conjunctiva/sclera: Conjunctivae normal.   Cardiovascular:      Rate and Rhythm: Normal rate and regular rhythm.      Pulses: Normal pulses.   Pulmonary:      Effort: Pulmonary effort is normal. No respiratory distress.      Breath sounds: Normal breath sounds.   Abdominal:      General: There is no distension.      Palpations: Abdomen is soft.      Tenderness: There is no abdominal tenderness.   Musculoskeletal:         General: Signs of injury present. No deformity.      Comments: Well-healing laceration to left thigh, no increased warmth compared to other digits, no purulence noted, white substance noted around nail however likely Dermabond from previous repair, full range of motion, neurovascular intact   Skin:     General: Skin is warm and dry.      Capillary Refill: Capillary refill takes less than 2 seconds.   Neurological:      General: No  focal deficit present.      Mental Status: He is alert. Mental status is at baseline.   Psychiatric:         Mood and Affect: Mood normal.         Behavior: Behavior normal.         ED Course                 Procedures             Critical Care time:               No results found for this or any previous visit (from the past 24 hour(s)).    Medications   doxycycline hyclate (VIBRAMYCIN) capsule 100 mg (100 mg Oral Given 8/21/22 5399)       Assessments & Plan (with Medical Decision Making)     I have reviewed the nursing notes.    I have reviewed the findings, diagnosis, plan and need for follow up with the patient.  67-year-old male here with concern for infection of left thumb.  Unlikely to be genuine infection as that would have happened by now given injury occurred several days ago.  No clinically significant erythema or other evidence of cellulitis.  Thumb is a little red but not overwhelmingly convincing for active cellulitis.  I searched area for hand surgeon appointment tomorrow, cannot get 1, I instructed patient to go to see his hand surgeon tomorrow in [place] and I will give him doxycycline for better MRSA coverage however again, I have a low suspicion this is active cellulitis.    Discharge Medication List as of 8/21/2022 11:25 PM      START taking these medications    Details   doxycycline hyclate (VIBRAMYCIN) 100 MG capsule Take 1 capsule (100 mg) by mouth 2 times daily for 7 days, Disp-14 capsule, R-0, E-Prescribe             Final diagnoses:   Pain of left thumb       8/21/2022   [facility]     Shailesh Liu MD  08/23/22 0719

## 2022-09-04 ENCOUNTER — HEALTH MAINTENANCE LETTER (OUTPATIENT)
Age: 67
End: 2022-09-04

## 2023-01-15 ENCOUNTER — HEALTH MAINTENANCE LETTER (OUTPATIENT)
Age: 68
End: 2023-01-15

## 2024-02-17 ENCOUNTER — HEALTH MAINTENANCE LETTER (OUTPATIENT)
Age: 69
End: 2024-02-17

## 2024-07-13 ENCOUNTER — HOSPITAL ENCOUNTER (EMERGENCY)
Facility: HOSPITAL | Age: 69
Discharge: HOME | End: 2024-07-13
Attending: PHYSICIAN ASSISTANT | Admitting: PHYSICIAN ASSISTANT
Payer: COMMERCIAL

## 2024-07-13 ENCOUNTER — APPOINTMENT (OUTPATIENT)
Dept: GENERAL RADIOLOGY | Facility: HOSPITAL | Age: 69
End: 2024-07-13
Attending: PHYSICIAN ASSISTANT
Payer: COMMERCIAL

## 2024-07-13 VITALS
RESPIRATION RATE: 18 BRPM | HEART RATE: 77 BPM | SYSTOLIC BLOOD PRESSURE: 140 MMHG | TEMPERATURE: 98 F | DIASTOLIC BLOOD PRESSURE: 82 MMHG | OXYGEN SATURATION: 94 %

## 2024-07-13 DIAGNOSIS — S60.10XA SUBUNGUAL HEMATOMA OF DIGIT OF HAND, INITIAL ENCOUNTER: ICD-10-CM

## 2024-07-13 DIAGNOSIS — S61.215A LACERATION OF LEFT RING FINGER WITHOUT FOREIGN BODY WITHOUT DAMAGE TO NAIL, INITIAL ENCOUNTER: ICD-10-CM

## 2024-07-13 PROCEDURE — 12001 RPR S/N/AX/GEN/TRNK 2.5CM/<: CPT

## 2024-07-13 PROCEDURE — 999N000104 HC STATISTIC NO CHARGE

## 2024-07-13 PROCEDURE — 12001 RPR S/N/AX/GEN/TRNK 2.5CM/<: CPT | Performed by: PHYSICIAN ASSISTANT

## 2024-07-13 PROCEDURE — 250N000009 HC RX 250: Performed by: PHYSICIAN ASSISTANT

## 2024-07-13 PROCEDURE — 73130 X-RAY EXAM OF HAND: CPT | Mod: LT

## 2024-07-13 RX ORDER — LIDOCAINE HYDROCHLORIDE 10 MG/ML
10 INJECTION, SOLUTION INFILTRATION; PERINEURAL ONCE
Status: COMPLETED | OUTPATIENT
Start: 2024-07-13 | End: 2024-07-13

## 2024-07-13 RX ADMIN — LIDOCAINE HYDROCHLORIDE 10 ML: 10 INJECTION, SOLUTION INFILTRATION; PERINEURAL at 21:05

## 2024-07-13 ASSESSMENT — ACTIVITIES OF DAILY LIVING (ADL): ADLS_ACUITY_SCORE: 38

## 2024-07-13 ASSESSMENT — ENCOUNTER SYMPTOMS: WOUND: 1

## 2024-07-14 NOTE — ED TRIAGE NOTES
Pt presents with c/o smashing finger with a blade while working this afternoon at 1400  Is currently still bleeding   No otc meds taken today

## 2024-07-14 NOTE — ED PROVIDER NOTES
History     Chief Complaint   Patient presents with    Hand Pain     HPI  Ned Pimentel is a 69 year old male who presents to urgent care for evaluation of injury to left ring finger.  Patient states that he was moving some implement equipment around when it fell onto his left ring finger smashing the tip of his finger.  Patient states that there is a laceration to the tip of his finger.  He denies any previous fractures, dislocations or surgeries to this finger.  Patient's last tetanus update was in 2022.    Allergies:  No Known Allergies    Problem List:    Patient Active Problem List    Diagnosis Date Noted    Lymphocytic colitis 08/18/2022     Priority: Medium    Fall 08/18/2022     Priority: Medium    Stage 3a chronic kidney disease (H) 08/18/2022     Priority: Medium    Open fracture of base of distal phalanx of left thumb 08/17/2022     Priority: Medium    Strain of left hamstring muscle, initial encounter 08/17/2022     Priority: Medium        Past Medical History:    Past Medical History:   Diagnosis Date    ED (erectile dysfunction)     Gout     Hypertension     Neuropathy     Upper GI bleed        Past Surgical History:    Past Surgical History:   Procedure Laterality Date    ARTHRODESIS ANKLE Right 9/18/2017    Procedure: ARTHRODESIS ANKLE;  REVISION TALONAVICULAR FUSION AND CALCANEALCUBOID FUSION (C-ARM,H PLATES, INFUSE);  Surgeon: Mary Jaeger MD;  Location: [facility]    arthroscopic knee surgery      HERNIA REPAIR      umbilical    ORTHOPEDIC SURGERY Right     foot surgery    SHOULDER SURGERY      SINUS SURGERY         Family History:    Family History   Problem Relation Age of Onset    Heart Disease Father         2 heart attacks by age 61    Heart Disease Brother         heart attack age 50       Social History:  Marital Status:   [2]  Social History     Tobacco Use    Smoking status: Never    Smokeless tobacco: Never   Substance Use Topics    Alcohol use: No    Drug use: No         Medications:    amLODIPine (NORVASC) 5 MG tablet  aspirin 81 MG EC tablet  losartan (COZAAR) 100 MG tablet  OVER-THE-COUNTER  OVER-THE-COUNTER  SUMAtriptan (IMITREX) 50 MG tablet  tadalafil (CIALIS) 20 MG tablet  triamcinolone (KENALOG) 0.1 % external cream          Review of Systems   Musculoskeletal:         Left ring finger injury   Skin:  Positive for wound.   All other systems reviewed and are negative.      Physical Exam   BP: 140/82  Pulse: 77  Temp: 98  F (36.7  C)  Resp: 18  SpO2: 94 %      Physical Exam  Vitals and nursing note reviewed.   Constitutional:       General: He is not in acute distress.     Appearance: Normal appearance. He is not ill-appearing or toxic-appearing.   Cardiovascular:      Rate and Rhythm: Regular rhythm.      Heart sounds: Normal heart sounds.   Pulmonary:      Breath sounds: Normal breath sounds.   Musculoskeletal:      Comments: Patient has swelling, and bruising as well as subungual hematoma to left ring finger.  Approximately 1 cm laceration noted to distal tip of the same finger.  Patient has full range of motion of finger, strength 5 out of 5, CMS intact.   Neurological:      Mental Status: He is oriented to person, place, and time.         ED Course        [facility]    -Laceration Repair    Date/Time: 7/13/2024 9:28 PM    Performed by: Mahendra Vuong PA-C  Authorized by: Mahendra Vuong PA-C    Risks, benefits and alternatives discussed.      ANESTHESIA (see MAR for exact dosages):     Anesthesia method:  Local infiltration    Local anesthetic:  Lidocaine 1% w/o epi  LACERATION DETAILS     Location:  Finger    Finger location:  L ring finger    Length (cm):  1    REPAIR TYPE:     Repair type:  Simple    EXPLORATION:     Hemostasis achieved with:  Direct pressure    Wound exploration: wound explored through full range of motion      Wound extent: no signs of injury, no nerve damage, no tendon damage, no underlying fracture and no vascular damage       Contaminated: no      TREATMENT:     Area cleansed with:  Hibiclens and Betadine    Amount of cleaning:  Standard    Irrigation solution:  Tap water    SKIN REPAIR     Repair method:  Sutures    Suture size:  4-0    Suture technique:  Simple interrupted    Number of sutures:  3    APPROXIMATION     Approximation:  Close    POST-PROCEDURE DETAILS     Dressing:  Antibiotic ointment and sterile dressing                 Critical Care time:               No results found for this or any previous visit (from the past 24 hour(s)).    Medications   lidocaine 1 % injection 10 mL (10 mLs Intradermal $Given 7/13/24 8045)       Assessments & Plan (with Medical Decision Making)   #1.  Laceration of left ring finger  #2.  Subungual hematoma of left ring finger    Discussed exam findings with patient as well as x-ray.  No obvious fracture or dislocation on x-ray as per my independent read.  Patient had laceration repaired tonight in urgent care; please refer to procedure note.  Patient had subungual hematoma evacuated with cautery pen and light pressure.  Patient tolerated this very well.  Wound care discussed extensively with patient and any sign of infection he is to return to emergency department immediately.  Otherwise he should return for suture removal in approximately 7 days.  Tylenol or ibuprofen as directed for any pain.  Patient verbalized understanding and agreement to plan.      I have reviewed the nursing notes.    I have reviewed the findings, diagnosis, plan and need for follow up with the patient.              New Prescriptions    No medications on file       Final diagnoses:   Laceration of left ring finger without foreign body without damage to nail, initial encounter   Subungual hematoma of digit of hand, initial encounter       7/13/2024   [facility]       Mahendra Vuong PA-C  07/13/24 7733

## 2025-03-09 ENCOUNTER — HEALTH MAINTENANCE LETTER (OUTPATIENT)
Age: 70
End: 2025-03-09

## (undated) DEVICE — SPONGE SURGIFOAM 100 1974

## (undated) DEVICE — GLOVE PROTEXIS W/NEU-THERA 7.5  2D73TE75

## (undated) DEVICE — Device

## (undated) DEVICE — SU VICRYL 3-0 PS-1 18" UND J683

## (undated) DEVICE — STPL SKIN 35W 059037

## (undated) DEVICE — DRAPE SHEET REV FOLD 3/4 9349

## (undated) DEVICE — LINEN TOWEL PACK X5 5464

## (undated) DEVICE — BLADE KNIFE SURG 15 371115

## (undated) DEVICE — BNDG ELASTIC 4" DBL LENGTH UNSTERILE 6611-14

## (undated) DEVICE — BLADE SAW OSCILLATING STRYK MED 9.0X25X0.38MM 2296-003-111

## (undated) DEVICE — GLOVE PROTEXIS W/NEU-THERA 8.0  2D73TE80

## (undated) DEVICE — SUCTION CANISTER MEDIVAC LINER 3000ML W/LID 65651-530

## (undated) DEVICE — DRSG STERI STRIP 1/2X4" R1547

## (undated) DEVICE — CAST PADDING 4" STERILE 9044S

## (undated) DEVICE — GLOVE PROTEXIS W/NEU-THERA 8.5  2D73TE85

## (undated) DEVICE — CAST PLASTER SPLINT 5X30" 7395

## (undated) DEVICE — CAST PADDING 4" UNSTERILE 9044

## (undated) DEVICE — GLOVE PROTEXIS MICRO 8.5  2D73PM85

## (undated) DEVICE — PACK EXTREMITY SOP15EXFSD

## (undated) DEVICE — DRSG ABDOMINAL 07 1/2X8" 7197D

## (undated) DEVICE — DRSG ADAPTIC 3X8" 6113

## (undated) DEVICE — GOWN XXLG REINFORCED 9071EL

## (undated) DEVICE — PREP DURAPREP 26ML APL 8630

## (undated) DEVICE — BLADE SAW SAGITTAL STRK 18X90X1.27MM HD SYS 6 6118-127-090

## (undated) DEVICE — IMM LIMB ELEVATOR DC40-0203

## (undated) DEVICE — DRSG GAUZE 4X4" 3033

## (undated) DEVICE — DEVICE RETRIEVER HEWSON 71111579

## (undated) RX ORDER — PROPOFOL 10 MG/ML
INJECTION, EMULSION INTRAVENOUS
Status: DISPENSED
Start: 2017-09-18

## (undated) RX ORDER — NEOSTIGMINE METHYLSULFATE 1 MG/ML
VIAL (ML) INJECTION
Status: DISPENSED
Start: 2017-09-18

## (undated) RX ORDER — GLYCOPYRROLATE 0.2 MG/ML
INJECTION, SOLUTION INTRAMUSCULAR; INTRAVENOUS
Status: DISPENSED
Start: 2017-09-18

## (undated) RX ORDER — FENTANYL CITRATE 50 UG/ML
INJECTION, SOLUTION INTRAMUSCULAR; INTRAVENOUS
Status: DISPENSED
Start: 2017-09-18

## (undated) RX ORDER — CEFAZOLIN SODIUM 2 G/100ML
INJECTION, SOLUTION INTRAVENOUS
Status: DISPENSED
Start: 2017-09-18